# Patient Record
Sex: FEMALE | Race: WHITE | NOT HISPANIC OR LATINO | Employment: OTHER | URBAN - METROPOLITAN AREA
[De-identification: names, ages, dates, MRNs, and addresses within clinical notes are randomized per-mention and may not be internally consistent; named-entity substitution may affect disease eponyms.]

---

## 2017-02-14 ENCOUNTER — APPOINTMENT (OUTPATIENT)
Dept: WOMENS IMAGING | Facility: HOSPITAL | Age: 52
End: 2017-02-14

## 2017-02-14 PROCEDURE — 77067 SCR MAMMO BI INCL CAD: CPT | Performed by: RADIOLOGY

## 2017-02-22 RX ORDER — ROSUVASTATIN CALCIUM 40 MG/1
40 TABLET, COATED ORAL DAILY
Qty: 90 TABLET | Refills: 0 | Status: SHIPPED | OUTPATIENT
Start: 2017-02-22 | End: 2017-05-19 | Stop reason: SDUPTHER

## 2017-05-19 ENCOUNTER — OFFICE VISIT (OUTPATIENT)
Dept: ENDOCRINOLOGY | Age: 52
End: 2017-05-19

## 2017-05-19 VITALS
DIASTOLIC BLOOD PRESSURE: 70 MMHG | HEIGHT: 68 IN | BODY MASS INDEX: 29.7 KG/M2 | WEIGHT: 196 LBS | SYSTOLIC BLOOD PRESSURE: 130 MMHG

## 2017-05-19 DIAGNOSIS — E78.5 HYPERLIPIDEMIA, UNSPECIFIED HYPERLIPIDEMIA TYPE: ICD-10-CM

## 2017-05-19 DIAGNOSIS — R53.82 CHRONIC FATIGUE: ICD-10-CM

## 2017-05-19 DIAGNOSIS — E55.9 AVITAMINOSIS D: ICD-10-CM

## 2017-05-19 DIAGNOSIS — E04.9 GOITER, NON-TOXIC: ICD-10-CM

## 2017-05-19 DIAGNOSIS — R73.03 PREDIABETES: ICD-10-CM

## 2017-05-19 DIAGNOSIS — E03.9 ADULT HYPOTHYROIDISM: Primary | ICD-10-CM

## 2017-05-19 PROCEDURE — 99214 OFFICE O/P EST MOD 30 MIN: CPT | Performed by: NURSE PRACTITIONER

## 2017-05-19 RX ORDER — ROSUVASTATIN CALCIUM 40 MG/1
40 TABLET, COATED ORAL DAILY
Qty: 90 TABLET | Refills: 3 | Status: SHIPPED | OUTPATIENT
Start: 2017-05-19 | End: 2017-05-31 | Stop reason: SDUPTHER

## 2017-05-19 RX ORDER — ERGOCALCIFEROL 1.25 MG/1
CAPSULE ORAL
Qty: 24 CAPSULE | Refills: 4 | Status: SHIPPED | OUTPATIENT
Start: 2017-05-19 | End: 2017-05-31 | Stop reason: SDUPTHER

## 2017-05-19 RX ORDER — LEVOTHYROXINE SODIUM 75 MCG
75 TABLET ORAL DAILY
Qty: 90 TABLET | Refills: 3 | Status: SHIPPED | OUTPATIENT
Start: 2017-05-19 | End: 2018-06-07

## 2017-05-25 LAB
25(OH)D3+25(OH)D2 SERPL-MCNC: 25.3 NG/ML (ref 30–100)
ACTH PLAS-MCNC: 23.1 PG/ML (ref 7.2–63.3)
ALBUMIN SERPL-MCNC: 4.7 G/DL (ref 3.5–5.2)
ALBUMIN/GLOB SERPL: 1.7 G/DL
ALP SERPL-CCNC: 62 U/L (ref 39–117)
ALT SERPL-CCNC: 77 U/L (ref 1–33)
AST SERPL-CCNC: 47 U/L (ref 1–32)
BILIRUB SERPL-MCNC: 0.4 MG/DL (ref 0.1–1.2)
BUN SERPL-MCNC: 13 MG/DL (ref 6–20)
BUN/CREAT SERPL: 18.6 (ref 7–25)
C PEPTIDE SERPL-MCNC: 3.6 NG/ML (ref 1.1–4.4)
CALCIUM SERPL-MCNC: 9.8 MG/DL (ref 8.6–10.5)
CHLORIDE SERPL-SCNC: 104 MMOL/L (ref 98–107)
CHOLEST SERPL-MCNC: 268 MG/DL (ref 0–200)
CO2 SERPL-SCNC: 25.5 MMOL/L (ref 22–29)
CORTIS SERPL-MCNC: 5.9 UG/DL
CREAT SERPL-MCNC: 0.7 MG/DL (ref 0.57–1)
FT4I SERPL CALC-MCNC: 2.2 (ref 1.2–4.9)
GLOBULIN SER CALC-MCNC: 2.8 GM/DL
GLUCOSE SERPL-MCNC: 103 MG/DL (ref 65–99)
HBA1C MFR BLD: 5.45 % (ref 4.8–5.6)
HDLC SERPL-MCNC: 67 MG/DL (ref 40–60)
LDLC SERPL CALC-MCNC: 185 MG/DL (ref 0–100)
POTASSIUM SERPL-SCNC: 4.4 MMOL/L (ref 3.5–5.2)
PROT SERPL-MCNC: 7.5 G/DL (ref 6–8.5)
SODIUM SERPL-SCNC: 143 MMOL/L (ref 136–145)
T3FREE SERPL-MCNC: 2.8 PG/ML (ref 2–4.4)
T3RU NFR SERPL: 31 % (ref 24–39)
T4 FREE SERPL-MCNC: 1.21 NG/DL (ref 0.93–1.7)
T4 SERPL-MCNC: 7 UG/DL (ref 4.5–12)
THYROGLOB AB SERPL-ACNC: 41 IU/ML
THYROGLOB SERPL-MCNC: 17 NG/ML
THYROGLOB SERPL-MCNC: ABNORMAL NG/ML
TRIGL SERPL-MCNC: 81 MG/DL (ref 0–150)
TSH SERPL DL<=0.005 MIU/L-ACNC: 2.07 UIU/ML (ref 0.45–4.5)
VLDLC SERPL CALC-MCNC: 16.2 MG/DL (ref 5–40)

## 2017-05-31 RX ORDER — ROSUVASTATIN CALCIUM 40 MG/1
40 TABLET, COATED ORAL DAILY
Qty: 90 TABLET | Refills: 1 | Status: SHIPPED | OUTPATIENT
Start: 2017-05-31 | End: 2018-05-31

## 2017-05-31 RX ORDER — ERGOCALCIFEROL 1.25 MG/1
CAPSULE ORAL
Qty: 36 CAPSULE | Refills: 1 | Status: SHIPPED | OUTPATIENT
Start: 2017-05-31 | End: 2018-07-23 | Stop reason: DRUGHIGH

## 2017-06-05 ENCOUNTER — HOSPITAL ENCOUNTER (OUTPATIENT)
Dept: ULTRASOUND IMAGING | Facility: HOSPITAL | Age: 52
Discharge: HOME | End: 2017-06-05
Admitting: NURSE PRACTITIONER

## 2017-06-05 DIAGNOSIS — E04.9 GOITER, NON-TOXIC: ICD-10-CM

## 2017-06-05 DIAGNOSIS — R53.82 CHRONIC FATIGUE: ICD-10-CM

## 2017-06-05 DIAGNOSIS — E03.9 ADULT HYPOTHYROIDISM: ICD-10-CM

## 2017-06-05 DIAGNOSIS — R73.03 PREDIABETES: ICD-10-CM

## 2017-06-05 DIAGNOSIS — E78.5 HYPERLIPIDEMIA, UNSPECIFIED HYPERLIPIDEMIA TYPE: ICD-10-CM

## 2017-06-05 DIAGNOSIS — E55.9 AVITAMINOSIS D: ICD-10-CM

## 2017-06-05 PROCEDURE — 76536 US EXAM OF HEAD AND NECK: CPT

## 2018-05-08 DIAGNOSIS — E03.9 ADULT HYPOTHYROIDISM: Primary | ICD-10-CM

## 2018-05-08 DIAGNOSIS — E55.9 AVITAMINOSIS D: ICD-10-CM

## 2018-05-08 DIAGNOSIS — E78.5 HYPERLIPIDEMIA, UNSPECIFIED HYPERLIPIDEMIA TYPE: ICD-10-CM

## 2018-05-11 ENCOUNTER — LAB (OUTPATIENT)
Dept: ENDOCRINOLOGY | Age: 53
End: 2018-05-11

## 2018-05-11 DIAGNOSIS — E03.9 ADULT HYPOTHYROIDISM: ICD-10-CM

## 2018-05-11 DIAGNOSIS — E55.9 AVITAMINOSIS D: ICD-10-CM

## 2018-05-11 DIAGNOSIS — E78.5 HYPERLIPIDEMIA, UNSPECIFIED HYPERLIPIDEMIA TYPE: ICD-10-CM

## 2018-05-16 LAB
25(OH)D3+25(OH)D2 SERPL-MCNC: 42 NG/ML (ref 30–100)
ALBUMIN SERPL-MCNC: 4.9 G/DL (ref 3.5–5.2)
ALBUMIN/GLOB SERPL: 2 G/DL
ALP SERPL-CCNC: 63 U/L (ref 39–117)
ALT SERPL-CCNC: 91 U/L (ref 1–33)
AST SERPL-CCNC: 56 U/L (ref 1–32)
BILIRUB SERPL-MCNC: 0.4 MG/DL (ref 0.1–1.2)
BUN SERPL-MCNC: 14 MG/DL (ref 6–20)
BUN/CREAT SERPL: 18.7 (ref 7–25)
CALCIUM SERPL-MCNC: 9.9 MG/DL (ref 8.6–10.5)
CHLORIDE SERPL-SCNC: 102 MMOL/L (ref 98–107)
CHOLEST SERPL-MCNC: 250 MG/DL (ref 0–200)
CO2 SERPL-SCNC: 26.2 MMOL/L (ref 22–29)
CREAT SERPL-MCNC: 0.75 MG/DL (ref 0.57–1)
FT4I SERPL CALC-MCNC: 2 (ref 1.2–4.9)
GFR SERPLBLD CREATININE-BSD FMLA CKD-EPI: 81 ML/MIN/1.73
GFR SERPLBLD CREATININE-BSD FMLA CKD-EPI: 98 ML/MIN/1.73
GLOBULIN SER CALC-MCNC: 2.4 GM/DL
GLUCOSE SERPL-MCNC: 99 MG/DL (ref 65–99)
HDLC SERPL-MCNC: 66 MG/DL (ref 40–60)
INTERPRETATION: NORMAL
LDLC SERPL CALC-MCNC: 166 MG/DL (ref 0–100)
POTASSIUM SERPL-SCNC: 4.9 MMOL/L (ref 3.5–5.2)
PROT SERPL-MCNC: 7.3 G/DL (ref 6–8.5)
SODIUM SERPL-SCNC: 143 MMOL/L (ref 136–145)
T3FREE SERPL-MCNC: 3.2 PG/ML (ref 2–4.4)
T3RU NFR SERPL: 28 % (ref 24–39)
T4 FREE SERPL-MCNC: 1.25 NG/DL (ref 0.93–1.7)
T4 SERPL-MCNC: 7.3 UG/DL (ref 4.5–12)
THYROGLOB AB SERPL-ACNC: 27 IU/ML
THYROGLOB SERPL-MCNC: 14 NG/ML
THYROGLOB SERPL-MCNC: ABNORMAL NG/ML
TRIGL SERPL-MCNC: 92 MG/DL (ref 0–150)
TSH SERPL DL<=0.005 MIU/L-ACNC: 2.83 UIU/ML (ref 0.45–4.5)
VLDLC SERPL CALC-MCNC: 18.4 MG/DL (ref 5–40)

## 2018-06-07 ENCOUNTER — OFFICE VISIT (OUTPATIENT)
Dept: ENDOCRINOLOGY | Age: 53
End: 2018-06-07

## 2018-06-07 VITALS
HEIGHT: 68 IN | WEIGHT: 206 LBS | BODY MASS INDEX: 31.22 KG/M2 | SYSTOLIC BLOOD PRESSURE: 122 MMHG | DIASTOLIC BLOOD PRESSURE: 76 MMHG

## 2018-06-07 DIAGNOSIS — E04.9 GOITER, NON-TOXIC: ICD-10-CM

## 2018-06-07 DIAGNOSIS — E04.1 SOLITARY THYROID NODULE: ICD-10-CM

## 2018-06-07 DIAGNOSIS — E55.9 AVITAMINOSIS D: ICD-10-CM

## 2018-06-07 DIAGNOSIS — E03.9 ADULT HYPOTHYROIDISM: Primary | ICD-10-CM

## 2018-06-07 DIAGNOSIS — E78.5 HYPERLIPIDEMIA, UNSPECIFIED HYPERLIPIDEMIA TYPE: ICD-10-CM

## 2018-06-07 DIAGNOSIS — R73.03 PREDIABETES: ICD-10-CM

## 2018-06-07 DIAGNOSIS — R79.89 LFT ELEVATION: ICD-10-CM

## 2018-06-07 DIAGNOSIS — R53.82 CHRONIC FATIGUE: ICD-10-CM

## 2018-06-07 PROBLEM — R00.2 HEART PALPITATIONS: Status: ACTIVE | Noted: 2018-06-07

## 2018-06-07 PROCEDURE — 99214 OFFICE O/P EST MOD 30 MIN: CPT | Performed by: NURSE PRACTITIONER

## 2018-06-07 RX ORDER — ROSUVASTATIN CALCIUM 40 MG/1
40 TABLET, COATED ORAL DAILY
Qty: 90 TABLET | Refills: 1 | Status: SHIPPED | OUTPATIENT
Start: 2018-06-07 | End: 2019-06-07

## 2018-06-07 RX ORDER — LEVOTHYROXINE SODIUM 88 MCG
88 TABLET ORAL DAILY
Qty: 90 TABLET | Refills: 3 | Status: SHIPPED | OUTPATIENT
Start: 2018-06-07 | End: 2019-02-05

## 2018-06-07 NOTE — PATIENT INSTRUCTIONS
crestor 40 mg once daily  Diet and exercise  Cardiology referral  Sleep medicine referral  Labs pending   Increase synthroid to 88 mcg daily on empty stomach  Thyroid u/s

## 2018-06-07 NOTE — PROGRESS NOTES
"Subjective   Theresa Palacio is a 52 y.o. female is here today for follow-up.  Chief Complaint   Patient presents with   • Hypothyroidism     recent labs   • Hyperlipidemia   • Goiter     /76   Ht 171.5 cm (67.5\")   Wt 93.4 kg (206 lb)   BMI 31.79 kg/m²   Current Outpatient Prescriptions on File Prior to Visit   Medication Sig   • aspirin 81 MG tablet Take 81 mg by mouth daily.   • vitamin D (ERGOCALCIFEROL) 49345 UNITS capsule capsule Take 1 capsule 3 times weekly   • [DISCONTINUED] SYNTHROID 75 MCG tablet Take 1 tablet by mouth Daily.     No current facility-administered medications on file prior to visit.      Family History   Problem Relation Age of Onset   • Thyroid disease Mother    • Diabetes Father      Social History   Substance Use Topics   • Smoking status: Former Smoker   • Smokeless tobacco: Not on file      Comment: 1 year back in high school    • Alcohol use Yes      Comment: socially      Allergies   Allergen Reactions   • Sulfa Antibiotics          History of Present Illness   Encounter Diagnoses   Name Primary?   • Hyperlipidemia, unspecified hyperlipidemia type    • Avitaminosis D    • Adult hypothyroidism Yes   • Goiter, non-toxic    • Chronic fatigue    • Prediabetes    • Solitary thyroid nodule    • LFT elevation      52-year-old female patient here today for a routine follow-up visit.  She is being seen for the above-mentioned problems.  She has a history of a thyroid nodule which was last checked a year ago.  She will have a repeat thyroid ultrasound ordered.  She had recent labs which were reviewed and she was provided a copy.  We missed getting her hemoglobin A1c so she will have additional labs at today's visit.  She has numerous complaints at today's visit.  She is complaining of palpitations that occur weekly, she is having problems with weight loss, chronic fatigue, and numbness around her face, arm, and leg sometimes.  She is starting to eat a gluten-free diet because she " feels that most of these issues are related to Hashimoto's.  She states she has read a book recently on Hashimoto's and she feels like this is the source of most of her problems.  She does snore at night has never had sleep medicine evaluation.  She has not been to a cardiologist.  She was to increase her thyroid dose if at all possible.  She is taking a probiotic for constipation.  She does feel like she is more anxious at times and is not sure if this is related to her thyroid.  She has not been taking her Crestor as prescribed.  She does have a history of fatty liver and has had a liver ultrasound in the past.    The following portions of the patient's history were reviewed and updated as appropriate: allergies, current medications, past family history, past medical history, past social history, past surgical history and problem list.    Review of Systems   Constitutional: Negative for fatigue.   HENT: Negative for trouble swallowing.    Eyes: Negative for visual disturbance.   Respiratory: Negative for shortness of breath.    Cardiovascular: Negative for leg swelling.   Endocrine: Negative for polyuria.   Skin: Negative for wound.   Neurological: Negative for numbness.       Objective   Physical Exam   Constitutional: She is oriented to person, place, and time. She appears well-developed and well-nourished. No distress.   HENT:   Head: Normocephalic and atraumatic.   Right Ear: External ear normal.   Left Ear: External ear normal.   Nose: Nose normal.   Mouth/Throat: Oropharynx is clear and moist. No oropharyngeal exudate.   Eyes: EOM are normal. Pupils are equal, round, and reactive to light. Right eye exhibits no discharge. Left eye exhibits no discharge.   Neck: Trachea normal, normal range of motion and full passive range of motion without pain. Neck supple. No tracheal tenderness present. Carotid bruit is not present. No tracheal deviation, no edema and no erythema present. No thyroid mass and no  thyromegaly present.   Cardiovascular: Normal rate, regular rhythm, normal heart sounds and intact distal pulses.  Exam reveals no gallop and no friction rub.    No murmur heard.  Pulmonary/Chest: Effort normal and breath sounds normal. No stridor. No respiratory distress. She has no wheezes. She has no rales.   Abdominal: Soft. Bowel sounds are normal. She exhibits no distension.   Musculoskeletal: Normal range of motion. She exhibits no edema or deformity.   Lymphadenopathy:     She has no cervical adenopathy.   Neurological: She is alert and oriented to person, place, and time.   Skin: Skin is warm and dry. No rash noted. She is not diaphoretic. No erythema. No pallor.   Psychiatric: She has a normal mood and affect. Her behavior is normal. Judgment and thought content normal.   Nursing note and vitals reviewed.        Assessment/Plan   Problems Addressed this Visit        Cardiovascular and Mediastinum    HLD (hyperlipidemia)    Relevant Medications    SYNTHROID 88 MCG tablet    rosuvastatin (CRESTOR) 40 MG tablet    Other Relevant Orders    Ambulatory Referral to Cardiology    Ambulatory Referral to Sleep Medicine    C-Peptide    Hemoglobin A1c    ACTH    Cortisol    US Thyroid       Digestive    Avitaminosis D    Relevant Medications    SYNTHROID 88 MCG tablet    Other Relevant Orders    Ambulatory Referral to Cardiology    Ambulatory Referral to Sleep Medicine    C-Peptide    Hemoglobin A1c    ACTH    Cortisol    US Thyroid       Endocrine    Adult hypothyroidism - Primary    Relevant Medications    SYNTHROID 88 MCG tablet    Other Relevant Orders    Ambulatory Referral to Cardiology    Ambulatory Referral to Sleep Medicine    C-Peptide    Hemoglobin A1c    ACTH    Cortisol    US Thyroid    Goiter, non-toxic    Relevant Medications    SYNTHROID 88 MCG tablet    Other Relevant Orders    Ambulatory Referral to Cardiology    Ambulatory Referral to Sleep Medicine    C-Peptide    Hemoglobin A1c    ACTH    Cortisol     US Thyroid    Solitary thyroid nodule    Relevant Medications    SYNTHROID 88 MCG tablet    Other Relevant Orders    US Thyroid       Other    Fatigue    Relevant Medications    SYNTHROID 88 MCG tablet    Other Relevant Orders    Ambulatory Referral to Cardiology    Ambulatory Referral to Sleep Medicine    C-Peptide    Hemoglobin A1c    ACTH    Cortisol    US Thyroid    LFT elevation    Relevant Orders    US Thyroid    Prediabetes    Relevant Medications    SYNTHROID 88 MCG tablet    Other Relevant Orders    Ambulatory Referral to Cardiology    Ambulatory Referral to Sleep Medicine    C-Peptide    Hemoglobin A1c    ACTH    Cortisol    US Thyroid      In summary, patient was seen and examined.  She will be referred to cardiologist for complaints of heart palpitations.  She will be referred to sleep medicine for further evaluation of chronic fatigue and snoring.  She will have a thyroid ultrasound scheduled.  She has been encouraged to diet exercise for weight loss.  She also has been restarted on Crestor 40 mg once daily for dyslipidemia and is currently uncontrolled.  She will have additional labs today to evaluate adrenal and A1c.  Her Synthroid has been increased to 88 µg daily.  She will follow-up in 6 months with Dr. Sandy.  crestor 40 mg once daily  Diet and exercise  Cardiology referral  Sleep medicine referral  Labs pending   Increase synthroid to 88 mcg daily on empty stomach  Thyroid u/s

## 2018-06-08 LAB
ACTH PLAS-MCNC: 27.7 PG/ML (ref 7.2–63.3)
C PEPTIDE SERPL-MCNC: 4.8 NG/ML (ref 1.1–4.4)
CORTIS SERPL-MCNC: 8.3 UG/DL
HBA1C MFR BLD: 5.84 % (ref 4.8–5.6)

## 2018-06-12 RX ORDER — ERGOCALCIFEROL 1.25 MG/1
CAPSULE ORAL
Qty: 8 CAPSULE | Refills: 3 | Status: SHIPPED | OUTPATIENT
Start: 2018-06-12 | End: 2019-02-05 | Stop reason: SDUPTHER

## 2018-06-25 ENCOUNTER — APPOINTMENT (OUTPATIENT)
Dept: WOMENS IMAGING | Facility: HOSPITAL | Age: 53
End: 2018-06-25

## 2018-06-25 PROCEDURE — 77067 SCR MAMMO BI INCL CAD: CPT | Performed by: RADIOLOGY

## 2018-06-25 PROCEDURE — 77063 BREAST TOMOSYNTHESIS BI: CPT | Performed by: RADIOLOGY

## 2018-06-28 ENCOUNTER — HOSPITAL ENCOUNTER (OUTPATIENT)
Dept: ULTRASOUND IMAGING | Facility: HOSPITAL | Age: 53
Discharge: HOME | End: 2018-06-28
Admitting: NURSE PRACTITIONER

## 2018-06-28 DIAGNOSIS — E55.9 AVITAMINOSIS D: ICD-10-CM

## 2018-06-28 DIAGNOSIS — E03.9 ADULT HYPOTHYROIDISM: ICD-10-CM

## 2018-06-28 DIAGNOSIS — R79.89 LFT ELEVATION: ICD-10-CM

## 2018-06-28 DIAGNOSIS — E78.5 HYPERLIPIDEMIA, UNSPECIFIED HYPERLIPIDEMIA TYPE: ICD-10-CM

## 2018-06-28 DIAGNOSIS — E04.1 SOLITARY THYROID NODULE: ICD-10-CM

## 2018-06-28 DIAGNOSIS — R73.03 PREDIABETES: ICD-10-CM

## 2018-06-28 DIAGNOSIS — E04.9 GOITER, NON-TOXIC: ICD-10-CM

## 2018-06-28 DIAGNOSIS — R53.82 CHRONIC FATIGUE: ICD-10-CM

## 2018-06-28 PROCEDURE — 76536 US EXAM OF HEAD AND NECK: CPT

## 2018-07-03 ENCOUNTER — APPOINTMENT (OUTPATIENT)
Dept: WOMENS IMAGING | Facility: HOSPITAL | Age: 53
End: 2018-07-03

## 2018-07-03 PROCEDURE — 77061 BREAST TOMOSYNTHESIS UNI: CPT | Performed by: RADIOLOGY

## 2018-07-03 PROCEDURE — G0279 TOMOSYNTHESIS, MAMMO: HCPCS | Performed by: RADIOLOGY

## 2018-07-03 PROCEDURE — 76641 ULTRASOUND BREAST COMPLETE: CPT | Performed by: RADIOLOGY

## 2018-07-03 PROCEDURE — 77065 DX MAMMO INCL CAD UNI: CPT | Performed by: RADIOLOGY

## 2018-07-24 ENCOUNTER — OFFICE VISIT (OUTPATIENT)
Dept: CARDIOLOGY | Facility: CLINIC | Age: 53
End: 2018-07-24

## 2018-07-24 VITALS
HEART RATE: 64 BPM | DIASTOLIC BLOOD PRESSURE: 74 MMHG | BODY MASS INDEX: 31.22 KG/M2 | SYSTOLIC BLOOD PRESSURE: 124 MMHG | WEIGHT: 206 LBS | HEIGHT: 68 IN

## 2018-07-24 DIAGNOSIS — R00.2 PALPITATIONS: Primary | ICD-10-CM

## 2018-07-24 DIAGNOSIS — R94.31 ABNORMAL EKG: ICD-10-CM

## 2018-07-24 DIAGNOSIS — R06.09 DOE (DYSPNEA ON EXERTION): ICD-10-CM

## 2018-07-24 PROCEDURE — 93000 ELECTROCARDIOGRAM COMPLETE: CPT | Performed by: INTERNAL MEDICINE

## 2018-07-24 PROCEDURE — 99204 OFFICE O/P NEW MOD 45 MIN: CPT | Performed by: INTERNAL MEDICINE

## 2018-07-24 NOTE — PROGRESS NOTES
Date of Office Visit: 2018  Encounter Provider: Marlin Ryder MD  Place of Service: [facility] CARDIOLOGY  Patient Name: Theresa Palacio  :1965    Chief complaint  Consult requested by CHELLE Kay for evaluation of palpitations.    History of Present Illness  The patient is a 53-year-old female with history of hyperlipidemia, hypercoagulable state and chronic constipation.  She states that she is active, but does not exercise.  She has a history of three miscarriages and in  had questionable neurologic symptoms.  At that time, she was assessed for hypercoagulable state and some of her blood work was abnormal, though specifics are not available.  She was also seen by Dr. Curtis and by description had a transesophageal echocardiogram performed that was negative.  I am not able to locate any of these records.  She states that she has a history of Hashimoto's disease diagnosed 10 years ago, but labs have been stable.  She states that for the last year, she has had palpitations that may occur several times a week, described as fluttering that lasts a few seconds, often associated with mild dyspnea.  She has also noted dyspnea on exertion over the past 1 year, especially when walking up stairs.  She has gained 8 pounds.  She does snore at night and has daytime somnolence, but has not noted apneic episodes.    Past Medical History:   Diagnosis Date   • Fatigue    • Hyperlipidemia    • Hypothyroidism    • LFT elevation    • Prediabetes    • Vitamin D deficiency      Past Surgical History:   Procedure Laterality Date   • APPENDECTOMY     •  SECTION     • HYSTERECTOMY     • LYMPHADENECTOMY     • TONSILLECTOMY       Outpatient Medications Prior to Visit   Medication Sig Dispense Refill   • aspirin 81 MG tablet Take 81 mg by mouth daily.     • rosuvastatin (CRESTOR) 40 MG tablet Take 1 tablet by mouth Daily. 90 tablet 1   • SYNTHROID 88 MCG tablet Take 1 tablet  by mouth Daily. 90 tablet 3   • vitamin D (ERGOCALCIFEROL) 19368 units capsule capsule TAKE ONE CAPSULE BY MOUTH TWICE  WEEKLY 8 capsule 3   • vitamin D (ERGOCALCIFEROL) 12177 UNITS capsule capsule Take 1 capsule 3 times weekly 36 capsule 1     No facility-administered medications prior to visit.        Allergies as of 07/24/2018 - Reviewed 07/24/2018   Allergen Reaction Noted   • Sulfa antibiotics  05/20/2016     Social History     Social History   • Marital status: Unknown     Spouse name: N/A   • Number of children: N/A   • Years of education: N/A     Occupational History   • Not on file.     Social History Main Topics   • Smoking status: Former Smoker     Years: 1.00   • Smokeless tobacco: Never Used      Comment: 1 year back in high school    • Alcohol use 2.4 oz/week     4 Glasses of wine per week      Comment: Caffeine - 2 cups daily   • Drug use: Unknown   • Sexual activity: Not on file     Other Topics Concern   • Not on file     Social History Narrative   • No narrative on file     Family History   Problem Relation Age of Onset   • Thyroid disease Mother    • Diabetes Father    • Stroke Father    • Stroke Maternal Grandmother    • Breast cancer Maternal Grandmother    • Stroke Maternal Grandfather      Review of Systems   Constitution: Positive for malaise/fatigue. Negative for fever, weight gain and weight loss.   HENT: Negative for ear pain, hearing loss, nosebleeds and sore throat.    Eyes: Negative for double vision, pain, vision loss in left eye and vision loss in right eye.   Cardiovascular: Positive for leg swelling.        See history of present illness.   Respiratory: Positive for shortness of breath. Negative for cough, sleep disturbances due to breathing, snoring and wheezing.    Endocrine: Negative for cold intolerance, heat intolerance and polyuria.   Skin: Negative for itching, poor wound healing and rash.   Musculoskeletal: Positive for myalgias. Negative for joint pain and joint swelling.  "  Gastrointestinal: Positive for abdominal pain. Negative for diarrhea, hematochezia, nausea and vomiting.        Intermittently diverticular pain   Genitourinary: Negative for hematuria and hesitancy.   Neurological: Positive for paresthesias. Negative for numbness and seizures.   Psychiatric/Behavioral: Negative for depression. The patient is nervous/anxious.         Objective:     Vitals:    07/24/18 1057 07/24/18 1103   BP: 120/70 124/74   BP Location: Right arm Left arm   Pulse: 64    Weight: 93.4 kg (206 lb)    Height: 171.5 cm (67.5\")      Body mass index is 31.79 kg/m².    Physical Exam   Constitutional: She is oriented to person, place, and time. She appears well-developed and well-nourished.   Obese   HENT:   Head: Normocephalic.   Nose: Nose normal.   Mouth/Throat: Oropharynx is clear and moist.   Eyes: Pupils are equal, round, and reactive to light. Conjunctivae and EOM are normal. Right eye exhibits no discharge. No scleral icterus.   Neck: Normal range of motion. Neck supple. No JVD present. No thyromegaly present.   Cardiovascular: Normal rate, regular rhythm, normal heart sounds and intact distal pulses.  Exam reveals no gallop and no friction rub.    No murmur heard.  Pulses:       Carotid pulses are 2+ on the right side, and 2+ on the left side.       Radial pulses are 2+ on the right side, and 2+ on the left side.        Femoral pulses are 2+ on the right side, and 2+ on the left side.       Popliteal pulses are 2+ on the right side, and 2+ on the left side.        Dorsalis pedis pulses are 2+ on the right side, and 2+ on the left side.        Posterior tibial pulses are 2+ on the right side, and 2+ on the left side.   Pulmonary/Chest: Effort normal and breath sounds normal. No respiratory distress. She has no wheezes. She has no rales.   Abdominal: Soft. Bowel sounds are normal. She exhibits no distension. There is no hepatosplenomegaly. There is no tenderness. There is no rebound. "   Musculoskeletal: Normal range of motion. She exhibits no edema or tenderness.   Neurological: She is alert and oriented to person, place, and time.   Skin: Skin is warm and dry. No rash noted. No erythema.   Psychiatric: She has a normal mood and affect. Her behavior is normal. Judgment and thought content normal.   Vitals reviewed.    Lab Review:     ECG 12 Lead  Date/Time: 7/24/2018 11:06 AM  Performed by: SUHA HOPPER  Authorized by: SUHA HOPPER   Comparison: compared with previous ECG   Similar to previous ECG  Rhythm: sinus rhythm  Conduction comments: Nonspecific ST T wave changes  Clinical impression: abnormal ECG          Assessment:       Diagnosis Plan   1. Palpitations  ECG 12 Lead    Holter Monitor - 72 Hour Up To 21 Days    Adult Transthoracic Echo Complete W/ Cont if Necessary Per Protocol   2. MARINELLI (dyspnea on exertion)  ECG 12 Lead    Adult Transthoracic Echo Complete W/ Cont if Necessary Per Protocol    Stress Test With Myocardial Perfusion One Day   3. Abnormal EKG  Holter Monitor - 72 Hour Up To 21 Days    Adult Transthoracic Echo Complete W/ Cont if Necessary Per Protocol    Stress Test With Myocardial Perfusion One Day     Plan:       1. Palpitations. Will place a 2-week Zio patch and also check an echocardiogram.   2. Dyspnea on exertion. Will check an exercise Cardiolite stress test. Her EKG is not normal with nonspecific anterior ST-T wave changes. Some of this was also present in 07/2017. She has nonspecific ST-T wave changes in the anterolateral leads and multiple risk factors for coronary artery disease.   3. Nonspecified hypercoagulable state. She was seen by Hematology in the past. She does not recall being told to take additional precautions.   4. Probable sleep apnea with snoring and daytime somnolence. Will check a home sleep study.   5. Dyspnea on exertion, as above.   6. Dyslipidemia. She just started taking Crestor on a regular basis and is to have follow-up blood work with Georgina  CHELLE Miller.   7. History of Hashimoto thyroiditis being managed by CHELLE Kay.        Your medication list          Accurate as of 7/24/18 11:59 PM. If you have any questions, ask your nurse or doctor.               CHANGE how you take these medications      Instructions Last Dose Given Next Dose Due   vitamin D 75147 units capsule capsule  Commonly known as:  ERGOCALCIFEROL  What changed:  Another medication with the same name was removed. Continue taking this medication, and follow the directions you see here.  Changed by:  Marlin Ryder MD      TAKE ONE CAPSULE BY MOUTH TWICE  WEEKLY          CONTINUE taking these medications      Instructions Last Dose Given Next Dose Due   aspirin 81 MG tablet      Take 81 mg by mouth daily.       rosuvastatin 40 MG tablet  Commonly known as:  CRESTOR      Take 1 tablet by mouth Daily.       SYNTHROID 88 MCG tablet  Generic drug:  levothyroxine      Take 1 tablet by mouth Daily.                  Dictated utilizing Dragon dictation

## 2018-07-25 ENCOUNTER — TELEPHONE (OUTPATIENT)
Dept: CARDIOLOGY | Facility: CLINIC | Age: 53
End: 2018-07-25

## 2018-07-25 DIAGNOSIS — R06.83 SNORING: Primary | ICD-10-CM

## 2018-07-25 DIAGNOSIS — R40.0 DAYTIME SOMNOLENCE: ICD-10-CM

## 2018-07-29 PROBLEM — R94.31 ABNORMAL EKG: Status: ACTIVE | Noted: 2018-07-29

## 2018-07-29 PROBLEM — R06.09 DOE (DYSPNEA ON EXERTION): Status: ACTIVE | Noted: 2018-07-29

## 2018-08-03 ENCOUNTER — APPOINTMENT (OUTPATIENT)
Dept: SLEEP MEDICINE | Facility: HOSPITAL | Age: 53
End: 2018-08-03
Attending: INTERNAL MEDICINE

## 2018-08-06 ENCOUNTER — APPOINTMENT (OUTPATIENT)
Dept: CARDIOLOGY | Facility: HOSPITAL | Age: 53
End: 2018-08-06
Attending: INTERNAL MEDICINE

## 2018-08-13 ENCOUNTER — TELEPHONE (OUTPATIENT)
Dept: CARDIOLOGY | Facility: CLINIC | Age: 53
End: 2018-08-13

## 2018-08-13 NOTE — TELEPHONE ENCOUNTER
Pt was calling to go over Zio Patch Results.  Please call her at [phone].    Dr Ryder-Do you want to change this pt to a home sleep monitor with Malaika?  Pt has NOT received a call from the  Sleep Lab.

## 2018-08-15 NOTE — TELEPHONE ENCOUNTER
Patient had some rare PACs and PVCs.  She had 8 runs of SVT (12 beats or less) on zio patch.  Her diary symptoms were not associated with arrhythmia or ectopy.  Patient's BP was 120/70 last office visit.  She has a history of hypothyroidism.    Patient needs to be sure to get sleep study done.       Dr. Ryder, would you like for me to discuss starting patient on a very low dose beta blocker or placing a referral to EP for further evaluation?

## 2018-08-16 DIAGNOSIS — R00.2 PALPITATIONS: Primary | ICD-10-CM

## 2018-08-16 RX ORDER — METOPROLOL SUCCINATE 25 MG/1
25 TABLET, EXTENDED RELEASE ORAL DAILY
Qty: 30 TABLET | Refills: 0 | Status: SHIPPED | OUTPATIENT
Start: 2018-08-16 | End: 2019-09-13 | Stop reason: SDUPTHER

## 2018-08-16 NOTE — TELEPHONE ENCOUNTER
Jenni- Do you want me to call her?  She was calling to go over results.  She can be reached at [phone].

## 2018-08-16 NOTE — TELEPHONE ENCOUNTER
Would start toprol xl 25 mg a dy and pursue sleep study and then can consider EP eval if sx persist. Also avoid stimulants. Please check with sleep lab and if not setup to do home sleep study in the next couple weeks, please have kimberly do this. flex

## 2018-08-16 NOTE — TELEPHONE ENCOUNTER
Info to pt.  No questions.  Pt will start Metoprolol and await call from [facility].  Rx sent.  Reminder set to check on pt in a few weeks.    Alessandro- Can you followup on the [facility] Sleep Study ref?

## 2018-10-02 ENCOUNTER — TELEPHONE (OUTPATIENT)
Dept: CARDIOLOGY | Facility: CLINIC | Age: 53
End: 2018-10-02

## 2018-10-04 DIAGNOSIS — G47.33 OSA (OBSTRUCTIVE SLEEP APNEA): Primary | ICD-10-CM

## 2018-10-10 NOTE — TELEPHONE ENCOUNTER
Spoke with pt.  Verbalized understanding.  Pt got appt for Sleep Consult 11/1/2018.  Pt has to move this appt.  Told her if she has ANY problems getting sooner vs later to let me know.  Ok with pt.  (done)

## 2018-10-12 NOTE — TELEPHONE ENCOUNTER
Her appointment with the pulmonologist is scheduled for 11-8-18, it was rescheduled from 11-1-18. She wanted to know if you could tell her how many times she stopped breathing during her sleep test. Pt # [phone]. brandi

## 2018-10-15 NOTE — TELEPHONE ENCOUNTER
Spoke with pt.  She will keep followup with Pulmonary and have him go over testing results.  (done)

## 2018-10-19 ENCOUNTER — HOSPITAL ENCOUNTER (OUTPATIENT)
Dept: CARDIOLOGY | Facility: HOSPITAL | Age: 53
Discharge: HOME | End: 2018-10-19
Attending: INTERNAL MEDICINE | Admitting: INTERNAL MEDICINE

## 2018-10-19 VITALS — BODY MASS INDEX: 30.31 KG/M2 | HEIGHT: 68 IN | WEIGHT: 200 LBS

## 2018-10-19 DIAGNOSIS — R06.09 DOE (DYSPNEA ON EXERTION): ICD-10-CM

## 2018-10-19 DIAGNOSIS — R94.31 ABNORMAL EKG: ICD-10-CM

## 2018-10-19 LAB
BH CV NUCLEAR PRIOR STUDY: 2
BH CV STRESS BP STAGE 1: NORMAL
BH CV STRESS BP STAGE 2: NORMAL
BH CV STRESS DURATION MIN STAGE 1: 3
BH CV STRESS DURATION MIN STAGE 2: 3
BH CV STRESS DURATION SEC STAGE 1: 0
BH CV STRESS DURATION SEC STAGE 2: 0
BH CV STRESS GRADE STAGE 1: 10
BH CV STRESS GRADE STAGE 2: 12
BH CV STRESS HR STAGE 1: 146
BH CV STRESS HR STAGE 2: 164
BH CV STRESS METS STAGE 1: 5
BH CV STRESS METS STAGE 2: 7.5
BH CV STRESS PROTOCOL 1: NORMAL
BH CV STRESS RECOVERY BP: NORMAL MMHG
BH CV STRESS RECOVERY HR: 97 BPM
BH CV STRESS SPEED STAGE 1: 1.7
BH CV STRESS SPEED STAGE 2: 2.5
BH CV STRESS STAGE 1: 1
BH CV STRESS STAGE 2: 2
LV EF NUC BP: 79 %
MAXIMAL PREDICTED HEART RATE: 165 BPM
PERCENT MAX PREDICTED HR: 99.39 %
STRESS BASELINE BP: NORMAL MMHG
STRESS BASELINE HR: 81 BPM
STRESS PERCENT HR: 117 %
STRESS POST ESTIMATED WORKLOAD: 7 METS
STRESS POST EXERCISE DUR MIN: 6 MIN
STRESS POST EXERCISE DUR SEC: 0 SEC
STRESS POST PEAK BP: NORMAL MMHG
STRESS POST PEAK HR: 164 BPM
STRESS TARGET HR: 140 BPM

## 2018-10-19 PROCEDURE — A9502 TC99M TETROFOSMIN: HCPCS | Performed by: INTERNAL MEDICINE

## 2018-10-19 PROCEDURE — 93016 CV STRESS TEST SUPVJ ONLY: CPT | Performed by: INTERNAL MEDICINE

## 2018-10-19 PROCEDURE — 78452 HT MUSCLE IMAGE SPECT MULT: CPT

## 2018-10-19 PROCEDURE — 0 TECHNETIUM TETROFOSMIN KIT: Performed by: INTERNAL MEDICINE

## 2018-10-19 PROCEDURE — 93018 CV STRESS TEST I&R ONLY: CPT | Performed by: INTERNAL MEDICINE

## 2018-10-19 PROCEDURE — 78452 HT MUSCLE IMAGE SPECT MULT: CPT | Performed by: INTERNAL MEDICINE

## 2018-10-19 PROCEDURE — 93017 CV STRESS TEST TRACING ONLY: CPT

## 2018-10-19 RX ADMIN — TETROFOSMIN 1 DOSE: 1.38 INJECTION, POWDER, LYOPHILIZED, FOR SOLUTION INTRAVENOUS at 08:35

## 2018-10-19 RX ADMIN — TETROFOSMIN 1 DOSE: 1.38 INJECTION, POWDER, LYOPHILIZED, FOR SOLUTION INTRAVENOUS at 09:15

## 2018-10-21 ENCOUNTER — TELEPHONE (OUTPATIENT)
Dept: CARDIOLOGY | Facility: CLINIC | Age: 53
End: 2018-10-21

## 2018-10-22 NOTE — TELEPHONE ENCOUNTER
Pt was calling to go over Stress test.  Pt is aware she may not get a call until after clinic 10/23.  She said just to call her at [phone].

## 2018-10-25 NOTE — TELEPHONE ENCOUNTER
Called patient regarding test results.  Strongly recommended she Sleep apnea appointment and also ask about the need for oxygen therapy at night in addition to CPAP.flex

## 2018-10-25 NOTE — TELEPHONE ENCOUNTER
Pt was calling again to go over results.  She is completely fine with me giving her those results.  Please let me know what you would like me to tell her.

## 2018-11-08 ENCOUNTER — APPOINTMENT (OUTPATIENT)
Dept: SLEEP MEDICINE | Facility: HOSPITAL | Age: 53
End: 2018-11-08
Attending: INTERNAL MEDICINE

## 2018-11-08 ENCOUNTER — OFFICE VISIT (OUTPATIENT)
Dept: SLEEP MEDICINE | Facility: HOSPITAL | Age: 53
End: 2018-11-08
Attending: INTERNAL MEDICINE

## 2018-11-08 VITALS
SYSTOLIC BLOOD PRESSURE: 145 MMHG | HEART RATE: 88 BPM | HEIGHT: 68 IN | WEIGHT: 208.2 LBS | DIASTOLIC BLOOD PRESSURE: 89 MMHG | BODY MASS INDEX: 31.55 KG/M2

## 2018-11-08 DIAGNOSIS — R00.2 PALPITATIONS: ICD-10-CM

## 2018-11-08 DIAGNOSIS — G47.33 OSA (OBSTRUCTIVE SLEEP APNEA): Primary | ICD-10-CM

## 2018-11-08 PROCEDURE — G0463 HOSPITAL OUTPT CLINIC VISIT: HCPCS

## 2018-11-08 NOTE — PROGRESS NOTES
[facility]  Telephone: [phone] / Fax: [phone] [place]  Telephone: [phone] / Fax: [phone] [place]    Referring Physician: Ann Garza MD  PCP: Ann Garza MD    Reason for consult:  sleep apnea    Theresa Palacio is a 53 y.o.female  was seen in the Sleep Disorders Center today for evaluation of sleep apnea.  She reports EDS for several years and mild snoring.  Her bedtime schedule is 10pm- 6am. It takes her up to 10min to fall asleep. She has developed palpitations recently and was evaluated by Dr. Marlin Ryder. In view of sleepiness and snoring, HST was done in September 2018 and showed severe KAYLI with AHI 46- lowest desaturation to 79%. Patient is here today to discuss sleep apnea management and set up CPAP. She is a never smoker and denies prior history of lung disease. Her ESS is 6. In the past 5 years she gained 10 pounds. She wakes up with a panic feeling and startling.      SH- no tobacco, drinks 4-6 glasses of wine, 2 cups of coffee.    ROS- + myalgias, rest is negative.    Theersa Palacio  has a past medical history of Fatigue; Hyperlipidemia; Hypothyroidism; LFT elevation; Prediabetes; and Vitamin D deficiency. Goiter    Current Medications:    Current Outpatient Prescriptions:   •  aspirin 81 MG tablet, Take 81 mg by mouth daily., Disp: , Rfl:   •  metoprolol succinate XL (TOPROL XL) 25 MG 24 hr tablet, Take 1 tablet by mouth Daily., Disp: 30 tablet, Rfl: 0  •  rosuvastatin (CRESTOR) 40 MG tablet, Take 1 tablet by mouth Daily., Disp: 90 tablet, Rfl: 1  •  SYNTHROID 88 MCG tablet, Take 1 tablet by mouth Daily., Disp: 90 tablet, Rfl: 3  •  vitamin D (ERGOCALCIFEROL) 14152 units capsule capsule, TAKE ONE CAPSULE BY MOUTH TWICE  WEEKLY, Disp: 8 capsule, Rfl: 3    I have reviewed Past Medical History, Past Surgical History, Medication List, Social History and Family History as entered in Sleep Questionnaire and EPIC.    ESS  6   Vital  "Signs /89   Pulse 88   Ht 172.7 cm (68\")   Wt 94.4 kg (208 lb 3.2 oz)   BMI 31.66 kg/m²  Body mass index is 31.66 kg/m².    General Alert and oriented. No acute distress noted   Pharynx/Throat Class IV Mallampati airway, large tongue, no evidence of redundant lateral pharyngeal tissue. No oral lesions. No thrush. Moist mucous membranes.   Head Normocephalic. Symmetrical. Atraumatic.    Nose No septal deviation. No drainage   Chest Wall Normal shape. Symmetric expansion with respiration. No tenderness.   Neck Trachea midline, no thyromegaly or adenopathy    Lungs Clear to auscultation bilaterally. No wheezes. No rhonchi. No rales. Respirations regular, even and unlabored.   Heart Regular rhythm and normal rate. Normal S1 and S2. No murmur   Abdomen Soft, non-tender and non-distended. Normal bowel sounds. No masses.   Extremities Moves all extremities well. No edema   Psychiatric Normal mood and affect.          HST-  9/25/18- Malaika- AHI 46, lowest desat 79% .     Impression:  1. KAYLI (obstructive sleep apnea)    2. Palpitations          Plan:  I discussed the pathophysiology of obstructive sleep apnea with the patient.  We discussed the adverse outcomes associated with untreated sleep-disordered breathing.  Hopefully palpitations will subside if appropriately treated KAYLI.  We discussed treatment modalities of obstructive sleep apnea including CPAP. She agrees to start.  I will set her up with auto CPAP 5-20cm H2O. I will order overnight oximetry on CPAP RA few weeks after set up to make sure desaturation is corrected with appropriate management of KAYLI. Arrange O2 with CPAP if she continues to desaturate on CPAP. She may also require titration study.      Thank you for allowing me to participate in your patient's care.    The patient will follow up with Dr. Murdock in 3 months.      CHELLE Lux  [facility]  Phone: [phone]      Part of this note may be an electronic " transcription/translation of spoken language to printed text using the Dragon Dictation System. Some errors may exist even though the document was edited.

## 2018-11-09 ENCOUNTER — TELEPHONE (OUTPATIENT)
Dept: SLEEP MEDICINE | Facility: HOSPITAL | Age: 53
End: 2018-11-09

## 2018-11-09 NOTE — TELEPHONE ENCOUNTER
Pt seen in clinic 11/8/18 to discuss treatment options and results of HST ([place]).  Paperwork sent to [facility] for set up and BRAYDEN testing. alberta

## 2019-01-09 DIAGNOSIS — R73.03 PREDIABETES: ICD-10-CM

## 2019-01-09 DIAGNOSIS — E04.1 SOLITARY THYROID NODULE: ICD-10-CM

## 2019-01-09 DIAGNOSIS — E04.9 GOITER, NON-TOXIC: ICD-10-CM

## 2019-01-09 DIAGNOSIS — R53.82 CHRONIC FATIGUE: ICD-10-CM

## 2019-01-09 DIAGNOSIS — R79.89 LFT ELEVATION: ICD-10-CM

## 2019-01-09 DIAGNOSIS — E55.9 AVITAMINOSIS D: Primary | ICD-10-CM

## 2019-01-09 DIAGNOSIS — E03.9 ADULT HYPOTHYROIDISM: ICD-10-CM

## 2019-01-11 ENCOUNTER — DOCUMENTATION (OUTPATIENT)
Dept: SLEEP MEDICINE | Facility: HOSPITAL | Age: 54
End: 2019-01-11

## 2019-01-11 ENCOUNTER — TELEPHONE (OUTPATIENT)
Dept: SLEEP MEDICINE | Facility: HOSPITAL | Age: 54
End: 2019-01-11

## 2019-01-11 NOTE — TELEPHONE ENCOUNTER
Tim phoned pt to report that overnight oximetry results were evaluated by Dr. Murdock to be normal.  No supplemental oxygen needed. alberta

## 2019-01-14 ENCOUNTER — LAB (OUTPATIENT)
Dept: ENDOCRINOLOGY | Age: 54
End: 2019-01-14

## 2019-01-14 DIAGNOSIS — E03.9 ADULT HYPOTHYROIDISM: ICD-10-CM

## 2019-01-14 DIAGNOSIS — E04.9 GOITER, NON-TOXIC: ICD-10-CM

## 2019-01-14 DIAGNOSIS — E04.1 SOLITARY THYROID NODULE: ICD-10-CM

## 2019-01-14 DIAGNOSIS — R53.82 CHRONIC FATIGUE: ICD-10-CM

## 2019-01-14 DIAGNOSIS — R73.03 PREDIABETES: ICD-10-CM

## 2019-01-14 DIAGNOSIS — R79.89 LFT ELEVATION: ICD-10-CM

## 2019-01-14 DIAGNOSIS — E55.9 AVITAMINOSIS D: ICD-10-CM

## 2019-01-20 LAB
25(OH)D3+25(OH)D2 SERPL-MCNC: 26.2 NG/ML (ref 30–100)
ALBUMIN SERPL-MCNC: 4.5 G/DL (ref 3.5–5.2)
ALBUMIN/GLOB SERPL: 1.4 G/DL
ALP SERPL-CCNC: 61 U/L (ref 39–117)
ALT SERPL-CCNC: 141 U/L (ref 1–33)
AST SERPL-CCNC: 90 U/L (ref 1–32)
BILIRUB SERPL-MCNC: 0.5 MG/DL (ref 0.1–1.2)
BUN SERPL-MCNC: 13 MG/DL (ref 6–20)
BUN/CREAT SERPL: 17.8 (ref 7–25)
C PEPTIDE SERPL-MCNC: 3.4 NG/ML (ref 1.1–4.4)
CALCIUM SERPL-MCNC: 9.9 MG/DL (ref 8.6–10.5)
CHLORIDE SERPL-SCNC: 102 MMOL/L (ref 98–107)
CHOLEST SERPL-MCNC: 242 MG/DL (ref 0–200)
CO2 SERPL-SCNC: 26.7 MMOL/L (ref 22–29)
CREAT SERPL-MCNC: 0.73 MG/DL (ref 0.57–1)
GLOBULIN SER CALC-MCNC: 3.2 GM/DL
GLUCOSE SERPL-MCNC: 105 MG/DL (ref 65–99)
HBA1C MFR BLD: 5.94 % (ref 4.8–5.6)
HDLC SERPL-MCNC: 64 MG/DL (ref 40–60)
INTERPRETATION: NORMAL
LDLC SERPL CALC-MCNC: 159 MG/DL (ref 0–100)
Lab: NORMAL
POTASSIUM SERPL-SCNC: 4.5 MMOL/L (ref 3.5–5.2)
PROT SERPL-MCNC: 7.7 G/DL (ref 6–8.5)
SODIUM SERPL-SCNC: 141 MMOL/L (ref 136–145)
T3FREE SERPL-MCNC: 3.1 PG/ML (ref 2–4.4)
T4 FREE SERPL-MCNC: 1.25 NG/DL (ref 0.93–1.7)
T4 SERPL-MCNC: 6.94 MCG/DL (ref 4.5–11.7)
THYROGLOB AB SERPL-ACNC: 62 IU/ML
THYROGLOB SERPL-MCNC: 13 NG/ML
THYROGLOB SERPL-MCNC: ABNORMAL NG/ML
TRIGL SERPL-MCNC: 95 MG/DL (ref 0–150)
TSH SERPL DL<=0.005 MIU/L-ACNC: 3.04 MIU/ML (ref 0.27–4.2)
URATE SERPL-MCNC: 5.2 MG/DL (ref 2.4–5.7)
VLDLC SERPL CALC-MCNC: 19 MG/DL (ref 5–40)

## 2019-02-01 ENCOUNTER — APPOINTMENT (OUTPATIENT)
Dept: SLEEP MEDICINE | Facility: HOSPITAL | Age: 54
End: 2019-02-01
Attending: INTERNAL MEDICINE

## 2019-02-05 ENCOUNTER — OFFICE VISIT (OUTPATIENT)
Dept: ENDOCRINOLOGY | Age: 54
End: 2019-02-05

## 2019-02-05 VITALS
WEIGHT: 208 LBS | BODY MASS INDEX: 31.52 KG/M2 | SYSTOLIC BLOOD PRESSURE: 124 MMHG | DIASTOLIC BLOOD PRESSURE: 80 MMHG | HEIGHT: 68 IN

## 2019-02-05 DIAGNOSIS — E04.1 SOLITARY THYROID NODULE: ICD-10-CM

## 2019-02-05 DIAGNOSIS — R79.89 LFT ELEVATION: ICD-10-CM

## 2019-02-05 DIAGNOSIS — E03.9 ADULT HYPOTHYROIDISM: Primary | ICD-10-CM

## 2019-02-05 DIAGNOSIS — E78.5 HYPERLIPIDEMIA, UNSPECIFIED HYPERLIPIDEMIA TYPE: ICD-10-CM

## 2019-02-05 DIAGNOSIS — E55.9 AVITAMINOSIS D: ICD-10-CM

## 2019-02-05 DIAGNOSIS — R73.03 PREDIABETES: ICD-10-CM

## 2019-02-05 DIAGNOSIS — R00.2 PALPITATIONS: ICD-10-CM

## 2019-02-05 PROBLEM — G47.33 OSA (OBSTRUCTIVE SLEEP APNEA): Status: ACTIVE | Noted: 2019-02-05

## 2019-02-05 PROCEDURE — 99214 OFFICE O/P EST MOD 30 MIN: CPT | Performed by: NURSE PRACTITIONER

## 2019-02-05 RX ORDER — LEVOTHYROXINE SODIUM 100 MCG
100 TABLET ORAL DAILY
Qty: 90 TABLET | Refills: 1 | Status: SHIPPED | OUTPATIENT
Start: 2019-02-05 | End: 2019-09-13 | Stop reason: SDUPTHER

## 2019-02-05 RX ORDER — COLESEVELAM HYDROCHLORIDE 3.75 G/1
3.75 POWDER, FOR SUSPENSION ORAL DAILY
Qty: 30 EACH | Refills: 5 | Status: SHIPPED | OUTPATIENT
Start: 2019-02-05 | End: 2019-02-28

## 2019-02-05 RX ORDER — ERGOCALCIFEROL 1.25 MG/1
CAPSULE ORAL
Qty: 24 CAPSULE | Refills: 1
Start: 2019-02-05 | End: 2019-09-13 | Stop reason: SDUPTHER

## 2019-02-05 NOTE — PROGRESS NOTES
"Subjective   Theresa Palacio is a 53 y.o. female is here today for follow-up.  Chief Complaint   Patient presents with   • Hypothyroidism     recent labs, pt is not taking metoprolol   • Hyperlipidemia   • Goiter   • Vitamin D Deficiency   • Prediabetes     /80   Ht 172.7 cm (68\")   Wt 94.3 kg (208 lb)   BMI 31.63 kg/m²   .medis  Family History   Problem Relation Age of Onset   • Thyroid disease Mother    • Diabetes Father    • Stroke Father    • Stroke Maternal Grandmother    • Breast cancer Maternal Grandmother    • Stroke Maternal Grandfather      Social History     Tobacco Use   • Smoking status: Former Smoker     Years: 1.00   • Smokeless tobacco: Never Used   • Tobacco comment: 1 year back in high school    Substance Use Topics   • Alcohol use: Yes     Alcohol/week: 2.4 oz     Types: 4 Glasses of wine per week     Comment: Caffeine - 2 cups daily   • Drug use: Not on file     Allergies   Allergen Reactions   • Sulfa Antibiotics          History of Present Illness  Encounter Diagnoses   Name Primary?   • Hyperlipidemia, unspecified hyperlipidemia type Yes   • Avitaminosis D    • Adult hypothyroidism    • Solitary thyroid nodule    • LFT elevation    • Prediabetes    • Palpitations      53-year-old female patient here today for routine follow-up visit.  She is being seen for the above-mentioned problems.  She has a history of elevated liver enzymes and quite her recent labs her liver enzymes have gone up significantly.  Her last liver ultrasound was in 2016.  She is currently not taken metoprolol that was prescribed by another provider.  Her last thyroid ultrasound was reviewed.  She does have a 1 cm nodule that was unchanged at last check.  She has not been taking her Crestor as prescribed for the past month.  She was recently treated and diagnosed with obstructive sleep apnea.  She is using a CPAP machine and states she is starting to feel better after a few weeks of use.  She complains that she is " unable to lose weight.  She denies any signs and symptoms of hyper or hypothyroidism.  Her previous labs were lifted in comparison to previous studies.  We discussed the benefit of WelChol to lower her cholesterol  The following portions of the patient's history were reviewed and updated as appropriate: allergies, current medications, past family history, past medical history, past social history, past surgical history and problem list.    Review of Systems   Constitutional: Negative for fatigue.   HENT: Negative for trouble swallowing.    Eyes: Negative for visual disturbance.   Cardiovascular: Negative for leg swelling.   Endocrine: Negative for polyuria.   Skin: Negative for wound.   Neurological: Negative for numbness.       Objective   Physical Exam   Constitutional: She is oriented to person, place, and time. She appears well-developed and well-nourished. No distress.   HENT:   Head: Normocephalic and atraumatic.   Right Ear: External ear normal.   Left Ear: External ear normal.   Nose: Nose normal.   Mouth/Throat: Oropharynx is clear and moist. No oropharyngeal exudate.   Eyes: EOM are normal. Pupils are equal, round, and reactive to light. Right eye exhibits no discharge. Left eye exhibits no discharge.   Neck: Trachea normal, normal range of motion and full passive range of motion without pain. Neck supple. No tracheal tenderness present. Carotid bruit is not present. No tracheal deviation, no edema and no erythema present. No thyroid mass and no thyromegaly present.   Cardiovascular: Normal rate, regular rhythm, normal heart sounds and intact distal pulses. Exam reveals no gallop and no friction rub.   No murmur heard.  Pulmonary/Chest: Effort normal and breath sounds normal. No stridor. No respiratory distress. She has no wheezes. She has no rales.   Abdominal: Soft. Bowel sounds are normal. She exhibits no distension.   Musculoskeletal: Normal range of motion. She exhibits no edema or deformity.    Lymphadenopathy:     She has no cervical adenopathy.   Neurological: She is alert and oriented to person, place, and time.   Skin: Skin is warm and dry. No rash noted. She is not diaphoretic. No erythema. No pallor.   Psychiatric: She has a normal mood and affect. Her behavior is normal. Judgment and thought content normal.   Nursing note and vitals reviewed.      Results for orders placed or performed in visit on 01/14/19   C-Peptide   Result Value Ref Range    C-Peptide 3.4 1.1 - 4.4 ng/mL   Lipid Panel   Result Value Ref Range    Total Cholesterol 242 (H) 0 - 200 mg/dL    Triglycerides 95 0 - 150 mg/dL    HDL Cholesterol 64 (H) 40 - 60 mg/dL    VLDL Cholesterol 19 5 - 40 mg/dL    LDL Cholesterol  159 (H) 0 - 100 mg/dL   Hemoglobin A1c   Result Value Ref Range    Hemoglobin A1C 5.94 (H) 4.80 - 5.60 %   Vitamin D 25 Hydroxy   Result Value Ref Range    25 Hydroxy, Vitamin D 26.2 (L) 30.0 - 100.0 ng/ml   Uric Acid   Result Value Ref Range    Uric Acid 5.2 2.4 - 5.7 mg/dL   T4, Free   Result Value Ref Range    Free T4 1.25 0.93 - 1.70 ng/dL   T4 & TSH ([facility])   Result Value Ref Range    TSH 3.040 0.270 - 4.200 mIU/mL    T4, Total 6.94 4.50 - 11.70 mcg/dL   T3, Free   Result Value Ref Range    T3, Free 3.1 2.0 - 4.4 pg/mL   Comprehensive Thyroglobulin   Result Value Ref Range    Thyroglobulin Ab 62 (H) IU/mL    Thyroglobulin Comment ng/mL    Thyroglobulin (TG-YANY) 13 ng/mL   Comprehensive Metabolic Panel   Result Value Ref Range    Glucose 105 (H) 65 - 99 mg/dL    BUN 13 6 - 20 mg/dL    Creatinine 0.73 0.57 - 1.00 mg/dL    eGFR Non African Am 83 >60 mL/min/1.73    eGFR African Am 100 >60 mL/min/1.73    BUN/Creatinine Ratio 17.8 7.0 - 25.0    Sodium 141 136 - 145 mmol/L    Potassium 4.5 3.5 - 5.2 mmol/L    Chloride 102 98 - 107 mmol/L    Total CO2 26.7 22.0 - 29.0 mmol/L    Calcium 9.9 8.6 - 10.5 mg/dL    Total Protein 7.7 6.0 - 8.5 g/dL    Albumin 4.50 3.50 - 5.20 g/dL    Globulin 3.2 gm/dL    A/G Ratio 1.4 g/dL     Total Bilirubin 0.5 0.1 - 1.2 mg/dL    Alkaline Phosphatase 61 39 - 117 U/L    AST (SGOT) 90 (H) 1 - 32 U/L    ALT (SGPT) 141 (H) 1 - 33 U/L   Cardiovascular Risk Assessment   Result Value Ref Range    Interpretation Note    Diabetes Patient Education   Result Value Ref Range    PDF Image Not applicable        Assessment/Plan   Problems Addressed this Visit        Cardiovascular and Mediastinum    HLD (hyperlipidemia) - Primary    Palpitations       Digestive    Avitaminosis D       Endocrine    Adult hypothyroidism    Solitary thyroid nodule       Other    LFT elevation    Prediabetes        In summary, patient was seen and examined.  Metabolically she is stable.  She is being treated for obstructive sleep apnea and is feeling better with fatigue.  We've increased her Synthroid 200 µg and she will remain on name brand.  She will have a thyroid ultrasound later this year.  She has been taken off Crestor and put on WelChol 1 pack daily as directed.  Her thyroid ultrasound from last year was reviewed.  She will have a repeat liver ultrasound due to elevated liver enzymes.  She has been advised to stop alcohol and Tylenol and to stop statin therapy.  She will follow-up in 6 months with Dr. Sandy or myself with labs prior    Stop crestor  Liver u/s to be scheduled  Avoid alcohol and tylenol  welchol 1 pack daily as directed  Thyroid u/s this year  Increase synthroid to 100 mcg daily on empty stomach

## 2019-02-05 NOTE — PATIENT INSTRUCTIONS
Stop crestor  Liver u/s to be scheduled  Avoid alcohol and tylenol  welchol 1 pack daily as directed  Thyroid u/s this year  Increase synthroid to 100 mcg daily on empty stomach

## 2019-02-22 ENCOUNTER — HOSPITAL ENCOUNTER (OUTPATIENT)
Dept: ULTRASOUND IMAGING | Facility: HOSPITAL | Age: 54
Discharge: HOME | End: 2019-02-22
Admitting: NURSE PRACTITIONER

## 2019-02-22 DIAGNOSIS — R00.2 PALPITATIONS: ICD-10-CM

## 2019-02-22 DIAGNOSIS — E78.5 HYPERLIPIDEMIA, UNSPECIFIED HYPERLIPIDEMIA TYPE: ICD-10-CM

## 2019-02-22 DIAGNOSIS — E04.1 SOLITARY THYROID NODULE: ICD-10-CM

## 2019-02-22 DIAGNOSIS — E03.9 ADULT HYPOTHYROIDISM: ICD-10-CM

## 2019-02-22 DIAGNOSIS — R79.89 LFT ELEVATION: ICD-10-CM

## 2019-02-22 DIAGNOSIS — E55.9 AVITAMINOSIS D: ICD-10-CM

## 2019-02-22 DIAGNOSIS — R73.03 PREDIABETES: ICD-10-CM

## 2019-02-22 PROCEDURE — 76705 ECHO EXAM OF ABDOMEN: CPT

## 2019-02-25 PROBLEM — K76.0 FATTY LIVER: Status: ACTIVE | Noted: 2019-02-25

## 2019-02-28 ENCOUNTER — TELEPHONE (OUTPATIENT)
Dept: ENDOCRINOLOGY | Age: 54
End: 2019-02-28

## 2019-02-28 NOTE — TELEPHONE ENCOUNTER
Pt has been called and informed of results. She expressed understanding    ----- Message from Floerncia Gardner sent at 2/28/2019  9:21 AM EST -----  Contact: patient   Ultrasound of liver and gall bladder   Looking for the results    Please call [phone]

## 2019-03-01 ENCOUNTER — APPOINTMENT (OUTPATIENT)
Dept: SLEEP MEDICINE | Facility: HOSPITAL | Age: 54
End: 2019-03-01
Attending: INTERNAL MEDICINE

## 2019-04-18 RX ORDER — ERGOCALCIFEROL 1.25 MG/1
CAPSULE ORAL
Qty: 8 CAPSULE | Refills: 2 | Status: SHIPPED | OUTPATIENT
Start: 2019-04-18 | End: 2019-09-13 | Stop reason: ALTCHOICE

## 2019-07-12 ENCOUNTER — APPOINTMENT (OUTPATIENT)
Dept: WOMENS IMAGING | Facility: HOSPITAL | Age: 54
End: 2019-07-12

## 2019-07-12 PROCEDURE — 77063 BREAST TOMOSYNTHESIS BI: CPT | Performed by: RADIOLOGY

## 2019-07-12 PROCEDURE — 77067 SCR MAMMO BI INCL CAD: CPT | Performed by: RADIOLOGY

## 2019-08-21 DIAGNOSIS — E04.1 SOLITARY THYROID NODULE: ICD-10-CM

## 2019-08-21 DIAGNOSIS — E78.5 HYPERLIPIDEMIA, UNSPECIFIED HYPERLIPIDEMIA TYPE: Primary | ICD-10-CM

## 2019-08-21 DIAGNOSIS — E03.9 ADULT HYPOTHYROIDISM: ICD-10-CM

## 2019-08-21 DIAGNOSIS — R53.82 CHRONIC FATIGUE: ICD-10-CM

## 2019-08-21 DIAGNOSIS — E55.9 AVITAMINOSIS D: ICD-10-CM

## 2019-08-21 DIAGNOSIS — E04.9 GOITER, NON-TOXIC: ICD-10-CM

## 2019-08-21 DIAGNOSIS — R73.03 PREDIABETES: ICD-10-CM

## 2019-09-05 ENCOUNTER — LAB (OUTPATIENT)
Dept: ENDOCRINOLOGY | Age: 54
End: 2019-09-05

## 2019-09-05 DIAGNOSIS — E78.5 HYPERLIPIDEMIA, UNSPECIFIED HYPERLIPIDEMIA TYPE: ICD-10-CM

## 2019-09-05 DIAGNOSIS — E55.9 AVITAMINOSIS D: ICD-10-CM

## 2019-09-05 DIAGNOSIS — R73.03 PREDIABETES: ICD-10-CM

## 2019-09-05 DIAGNOSIS — E03.9 ADULT HYPOTHYROIDISM: ICD-10-CM

## 2019-09-05 DIAGNOSIS — E04.1 SOLITARY THYROID NODULE: ICD-10-CM

## 2019-09-05 DIAGNOSIS — E04.9 GOITER, NON-TOXIC: ICD-10-CM

## 2019-09-05 DIAGNOSIS — R53.82 CHRONIC FATIGUE: ICD-10-CM

## 2019-09-10 LAB
25(OH)D3+25(OH)D2 SERPL-MCNC: 33.6 NG/ML (ref 30–100)
ALBUMIN SERPL-MCNC: 4.6 G/DL (ref 3.5–5.2)
ALBUMIN/GLOB SERPL: 1.8 G/DL
ALP SERPL-CCNC: 57 U/L (ref 39–117)
ALT SERPL-CCNC: 69 U/L (ref 1–33)
AST SERPL-CCNC: 44 U/L (ref 1–32)
BILIRUB SERPL-MCNC: 0.4 MG/DL (ref 0.2–1.2)
BUN SERPL-MCNC: 15 MG/DL (ref 6–20)
BUN/CREAT SERPL: 22.7 (ref 7–25)
C PEPTIDE SERPL-MCNC: 3.7 NG/ML (ref 1.1–4.4)
CALCIUM SERPL-MCNC: 9.6 MG/DL (ref 8.6–10.5)
CHLORIDE SERPL-SCNC: 104 MMOL/L (ref 98–107)
CHOLEST SERPL-MCNC: 238 MG/DL (ref 0–200)
CO2 SERPL-SCNC: 28.4 MMOL/L (ref 22–29)
CREAT SERPL-MCNC: 0.66 MG/DL (ref 0.57–1)
GLOBULIN SER CALC-MCNC: 2.5 GM/DL
GLUCOSE SERPL-MCNC: 106 MG/DL (ref 65–99)
HBA1C MFR BLD: 6.2 % (ref 4.8–5.6)
HDLC SERPL-MCNC: 55 MG/DL (ref 40–60)
INTERPRETATION: NORMAL
LDLC SERPL CALC-MCNC: 164 MG/DL (ref 0–100)
Lab: NORMAL
POTASSIUM SERPL-SCNC: 4.9 MMOL/L (ref 3.5–5.2)
PROT SERPL-MCNC: 7.1 G/DL (ref 6–8.5)
SODIUM SERPL-SCNC: 144 MMOL/L (ref 136–145)
T3FREE SERPL-MCNC: 3.1 PG/ML (ref 2–4.4)
T4 FREE SERPL-MCNC: 1.35 NG/DL (ref 0.93–1.7)
T4 SERPL-MCNC: 7.81 MCG/DL (ref 4.5–11.7)
THYROGLOB AB SERPL-ACNC: 81 IU/ML
THYROGLOB SERPL-MCNC: 18 NG/ML
THYROGLOB SERPL-MCNC: ABNORMAL NG/ML
TRIGL SERPL-MCNC: 93 MG/DL (ref 0–150)
TSH SERPL DL<=0.005 MIU/L-ACNC: 2.36 UIU/ML (ref 0.27–4.2)
URATE SERPL-MCNC: 5.7 MG/DL (ref 2.4–5.7)
VLDLC SERPL CALC-MCNC: 18.6 MG/DL

## 2019-09-13 ENCOUNTER — TELEPHONE (OUTPATIENT)
Dept: ENDOCRINOLOGY | Age: 54
End: 2019-09-13

## 2019-09-13 ENCOUNTER — OFFICE VISIT (OUTPATIENT)
Dept: ENDOCRINOLOGY | Age: 54
End: 2019-09-13

## 2019-09-13 VITALS
HEIGHT: 68 IN | BODY MASS INDEX: 31.34 KG/M2 | DIASTOLIC BLOOD PRESSURE: 78 MMHG | SYSTOLIC BLOOD PRESSURE: 122 MMHG | RESPIRATION RATE: 16 BRPM | WEIGHT: 206.8 LBS

## 2019-09-13 DIAGNOSIS — K76.0 FATTY LIVER: ICD-10-CM

## 2019-09-13 DIAGNOSIS — R73.03 PREDIABETES: ICD-10-CM

## 2019-09-13 DIAGNOSIS — E04.1 SOLITARY THYROID NODULE: ICD-10-CM

## 2019-09-13 DIAGNOSIS — R53.82 CHRONIC FATIGUE: ICD-10-CM

## 2019-09-13 DIAGNOSIS — E78.2 MIXED HYPERLIPIDEMIA: ICD-10-CM

## 2019-09-13 DIAGNOSIS — E03.9 ADULT HYPOTHYROIDISM: Primary | ICD-10-CM

## 2019-09-13 DIAGNOSIS — E55.9 AVITAMINOSIS D: ICD-10-CM

## 2019-09-13 DIAGNOSIS — R00.2 PALPITATIONS: ICD-10-CM

## 2019-09-13 PROCEDURE — 99214 OFFICE O/P EST MOD 30 MIN: CPT | Performed by: INTERNAL MEDICINE

## 2019-09-13 RX ORDER — LEVOTHYROXINE SODIUM 100 MCG
100 TABLET ORAL DAILY
Qty: 90 TABLET | Refills: 3 | Status: SHIPPED | OUTPATIENT
Start: 2019-09-13 | End: 2021-12-27 | Stop reason: SDUPTHER

## 2019-09-13 RX ORDER — ERGOCALCIFEROL 1.25 MG/1
CAPSULE ORAL
Qty: 39 CAPSULE | Refills: 3
Start: 2019-09-13 | End: 2019-09-25 | Stop reason: SDUPTHER

## 2019-09-13 RX ORDER — ROSUVASTATIN CALCIUM 20 MG/1
20 TABLET, COATED ORAL NIGHTLY
Qty: 90 TABLET | Refills: 3 | Status: SHIPPED | OUTPATIENT
Start: 2019-09-13 | End: 2020-09-12

## 2019-09-13 RX ORDER — PANTOPRAZOLE SODIUM 40 MG/1
TABLET, DELAYED RELEASE ORAL
COMMUNITY
Start: 2019-08-30 | End: 2021-12-27

## 2019-09-13 RX ORDER — ERGOCALCIFEROL 1.25 MG/1
CAPSULE ORAL
Qty: 39 CAPSULE | Refills: 3
Start: 2019-09-13 | End: 2019-09-13 | Stop reason: SDUPTHER

## 2019-09-13 RX ORDER — METOPROLOL SUCCINATE 25 MG/1
25 TABLET, EXTENDED RELEASE ORAL DAILY
Qty: 90 TABLET | Refills: 3 | Status: SHIPPED | OUTPATIENT
Start: 2019-09-13 | End: 2021-12-27

## 2019-09-13 NOTE — PROGRESS NOTES
"Subjective   Theresa Palacio is a 54 y.o. female seen for follow up for hyperlipidemia, hypothyroidism, vit d deficiency, lab review.    History of Present Illness this is a 54-year-old female known patient with hypothyroidism and goiter as well as vitamin D deficiency and dyslipidemia and fatty liver and prediabetes..  Over the course of last 6 months she has had no significant health problems for which to go to the ER or hospital.     /78   Resp 16   Ht 172.7 cm (68\")   Wt 93.8 kg (206 lb 12.8 oz)   BMI 31.44 kg/m²      Allergies   Allergen Reactions   • Sulfa Antibiotics        Current Outpatient Medications:   •  metoprolol succinate XL (TOPROL XL) 25 MG 24 hr tablet, Take 1 tablet by mouth Daily., Disp: 30 tablet, Rfl: 0  •  pantoprazole (PROTONIX) 40 MG EC tablet, , Disp: , Rfl:   •  SYNTHROID 100 MCG tablet, Take 1 tablet by mouth Daily., Disp: 90 tablet, Rfl: 1  •  vitamin D (ERGOCALCIFEROL) 92791 units capsule capsule, Take 1 cap twice weekly, Disp: 24 capsule, Rfl: 1      The following portions of the patient's history were reviewed and updated as appropriate: allergies, current medications, past family history, past medical history, past social history, past surgical history and problem list.    Review of Systems   Constitutional: Negative.    HENT: Negative.    Eyes: Negative.    Respiratory: Negative.    Cardiovascular: Negative.    Gastrointestinal: Negative.    Endocrine: Negative.    Genitourinary: Negative.    Musculoskeletal: Negative.    Skin: Negative.    Allergic/Immunologic: Negative.    Neurological: Negative.    Hematological: Negative.    Psychiatric/Behavioral: Negative.        Objective   Physical Exam   Constitutional: She is oriented to person, place, and time. She appears well-developed and well-nourished. No distress.   HENT:   Head: Normocephalic and atraumatic.   Right Ear: External ear normal.   Left Ear: External ear normal.   Nose: Nose normal.   Mouth/Throat: " Oropharynx is clear and moist. No oropharyngeal exudate.   Eyes: Conjunctivae and EOM are normal. Pupils are equal, round, and reactive to light. Right eye exhibits no discharge. Left eye exhibits no discharge. No scleral icterus.   Neck: Trachea normal, normal range of motion and full passive range of motion without pain. Neck supple. No JVD present. No tracheal tenderness present. Carotid bruit is not present. No tracheal deviation, no edema and no erythema present. No thyroid mass and no thyromegaly present.   Cardiovascular: Normal rate, regular rhythm, normal heart sounds and intact distal pulses. Exam reveals no gallop and no friction rub.   No murmur heard.  Pulmonary/Chest: Effort normal and breath sounds normal. No stridor. No respiratory distress. She has no wheezes. She has no rales. She exhibits no tenderness.   Abdominal: Soft. Bowel sounds are normal. She exhibits no distension and no mass. There is no tenderness. There is no rebound and no guarding. No hernia.   Musculoskeletal: Normal range of motion. She exhibits no edema, tenderness or deformity.   Lymphadenopathy:     She has no cervical adenopathy.   Neurological: She is alert and oriented to person, place, and time. She has normal reflexes. She displays normal reflexes. No cranial nerve deficit or sensory deficit. She exhibits normal muscle tone. Coordination normal.   Skin: Skin is warm and dry. No rash noted. She is not diaphoretic. No erythema. No pallor.   Psychiatric: She has a normal mood and affect. Her behavior is normal. Judgment and thought content normal.   Nursing note and vitals reviewed.       Results for orders placed or performed in visit on 09/05/19   Vitamin D 25 Hydroxy   Result Value Ref Range    25 Hydroxy, Vitamin D 33.6 30.0 - 100.0 ng/mL   Uric Acid   Result Value Ref Range    Uric Acid 5.7 2.4 - 5.7 mg/dL   Lipid Panel   Result Value Ref Range    Total Cholesterol 238 (H) 0 - 200 mg/dL    Triglycerides 93 0 - 150 mg/dL     HDL Cholesterol 55 40 - 60 mg/dL    VLDL Cholesterol 18.6 mg/dL    LDL Cholesterol  164 (H) 0 - 100 mg/dL   Hemoglobin A1c   Result Value Ref Range    Hemoglobin A1C 6.20 (H) 4.80 - 5.60 %   C-Peptide   Result Value Ref Range    C-Peptide 3.7 1.1 - 4.4 ng/mL   T4, Free   Result Value Ref Range    Free T4 1.35 0.93 - 1.70 ng/dL   T4 & TSH ([facility])   Result Value Ref Range    TSH 2.360 0.270 - 4.200 uIU/mL    T4, Total 7.81 4.50 - 11.70 mcg/dL   T3, Free   Result Value Ref Range    T3, Free 3.1 2.0 - 4.4 pg/mL   Comprehensive Thyroglobulin   Result Value Ref Range    Thyroglobulin Ab 81 (H) IU/mL    Thyroglobulin Comment ng/mL    Thyroglobulin (TG-YANY) 18 ng/mL   Comprehensive Metabolic Panel   Result Value Ref Range    Glucose 106 (H) 65 - 99 mg/dL    BUN 15 6 - 20 mg/dL    Creatinine 0.66 0.57 - 1.00 mg/dL    eGFR Non African Am 93 >60 mL/min/1.73    eGFR African Am 112 >60 mL/min/1.73    BUN/Creatinine Ratio 22.7 7.0 - 25.0    Sodium 144 136 - 145 mmol/L    Potassium 4.9 3.5 - 5.2 mmol/L    Chloride 104 98 - 107 mmol/L    Total CO2 28.4 22.0 - 29.0 mmol/L    Calcium 9.6 8.6 - 10.5 mg/dL    Total Protein 7.1 6.0 - 8.5 g/dL    Albumin 4.60 3.50 - 5.20 g/dL    Globulin 2.5 gm/dL    A/G Ratio 1.8 g/dL    Total Bilirubin 0.4 0.2 - 1.2 mg/dL    Alkaline Phosphatase 57 39 - 117 U/L    AST (SGOT) 44 (H) 1 - 32 U/L    ALT (SGPT) 69 (H) 1 - 33 U/L   Cardiovascular Risk Assessment   Result Value Ref Range    Interpretation Note    Diabetes Patient Education   Result Value Ref Range    PDF Image Not applicable          Assessment/Plan   Diagnoses and all orders for this visit:    Adult hypothyroidism  -     T4 & TSH ([facility]); Future  -     T3, Free; Future  -     T4, Free; Future  -     Thyroglobulin With Anti-TG; Future  -     Uric Acid; Future  -     Vitamin D 25 Hydroxy; Future  -     Comprehensive Metabolic Panel; Future  -     C-Peptide; Future  -     Hemoglobin A1c; Future  -     MicroAlbumin, Urine, Random - Urine,  Clean Catch; Future  -     NMR LipoProfile; Future    Solitary thyroid nodule  -     T4 & TSH ([facility]); Future  -     T3, Free; Future  -     T4, Free; Future  -     Thyroglobulin With Anti-TG; Future  -     Uric Acid; Future  -     Vitamin D 25 Hydroxy; Future  -     Comprehensive Metabolic Panel; Future  -     C-Peptide; Future  -     Hemoglobin A1c; Future  -     MicroAlbumin, Urine, Random - Urine, Clean Catch; Future  -     NMR LipoProfile; Future    Chronic fatigue  -     T4 & TSH ([facility]); Future  -     T3, Free; Future  -     T4, Free; Future  -     Thyroglobulin With Anti-TG; Future  -     Uric Acid; Future  -     Vitamin D 25 Hydroxy; Future  -     Comprehensive Metabolic Panel; Future  -     C-Peptide; Future  -     Hemoglobin A1c; Future  -     MicroAlbumin, Urine, Random - Urine, Clean Catch; Future  -     NMR LipoProfile; Future    Prediabetes  -     T4 & TSH ([facility]); Future  -     T3, Free; Future  -     T4, Free; Future  -     Thyroglobulin With Anti-TG; Future  -     Uric Acid; Future  -     Vitamin D 25 Hydroxy; Future  -     Comprehensive Metabolic Panel; Future  -     C-Peptide; Future  -     Hemoglobin A1c; Future  -     MicroAlbumin, Urine, Random - Urine, Clean Catch; Future  -     NMR LipoProfile; Future    Mixed hyperlipidemia  -     T4 & TSH ([facility]); Future  -     T3, Free; Future  -     T4, Free; Future  -     Thyroglobulin With Anti-TG; Future  -     Uric Acid; Future  -     Vitamin D 25 Hydroxy; Future  -     Comprehensive Metabolic Panel; Future  -     C-Peptide; Future  -     Hemoglobin A1c; Future  -     MicroAlbumin, Urine, Random - Urine, Clean Catch; Future  -     NMR LipoProfile; Future    Avitaminosis D  -     T4 & TSH ([facility]); Future  -     T3, Free; Future  -     T4, Free; Future  -     Thyroglobulin With Anti-TG; Future  -     Uric Acid; Future  -     Vitamin D 25 Hydroxy; Future  -     Comprehensive Metabolic Panel; Future  -     C-Peptide; Future  -     Hemoglobin  A1c; Future  -     MicroAlbumin, Urine, Random - Urine, Clean Catch; Future  -     NMR LipoProfile; Future    Fatty liver  -     T4 & TSH ([facility]); Future  -     T3, Free; Future  -     T4, Free; Future  -     Thyroglobulin With Anti-TG; Future  -     Uric Acid; Future  -     Vitamin D 25 Hydroxy; Future  -     Comprehensive Metabolic Panel; Future  -     C-Peptide; Future  -     Hemoglobin A1c; Future  -     MicroAlbumin, Urine, Random - Urine, Clean Catch; Future  -     NMR LipoProfile; Future    Palpitations  -     metoprolol succinate XL (TOPROL XL) 25 MG 24 hr tablet; Take 1 tablet by mouth Daily.  -     T4 & TSH ([facility]); Future  -     T3, Free; Future  -     T4, Free; Future  -     Thyroglobulin With Anti-TG; Future  -     Uric Acid; Future  -     Vitamin D 25 Hydroxy; Future  -     Comprehensive Metabolic Panel; Future  -     C-Peptide; Future  -     Hemoglobin A1c; Future  -     MicroAlbumin, Urine, Random - Urine, Clean Catch; Future  -     NMR LipoProfile; Future    Other orders  -     vitamin D (ERGOCALCIFEROL) 70960 units capsule capsule; Take 1 cap 3 times weekly  -     SYNTHROID 100 MCG tablet; Take 1 tablet by mouth Daily.  -     rosuvastatin (CRESTOR) 20 MG tablet; Take 1 tablet by mouth Every Night.  -     metFORMIN (GLUCOPHAGE) 850 MG tablet; Take 1 tablet by mouth 2 (Two) Times a Day With Meals.      In the summary I saw and examined this 54-year-old female for above-mentioned problems.  I reviewed her laboratory evaluation of 5/9/2019 and provided her with a hard copy of it.  Aside from her cholesterol and LDL and ALT and AST elevations she is otherwise clinically and metabolically stable and therefore we will go ahead and continue her prescriptions.  However I am going to go ahead and start her on Crestor 20 mg daily and metformin 850 mg twice daily.  She will see Ms. Georgina Miller in 6 months or sooner if needed with laboratory evaluation prior to each office visit.

## 2019-09-13 NOTE — TELEPHONE ENCOUNTER
Patient said she went to [facility] today to  her scripts and they did not have a script on file for Vitamin D. She said Dr. Sandy told her he was going to increase to 3xweek. I told her I can see the script from today but cannot see receipt that it was received by pharmacy.   Pt [phone]

## 2019-09-23 RX ORDER — ERGOCALCIFEROL 1.25 MG/1
CAPSULE ORAL
Qty: 8 CAPSULE | Refills: 1 | OUTPATIENT
Start: 2019-09-23

## 2019-09-25 ENCOUNTER — TELEPHONE (OUTPATIENT)
Dept: ENDOCRINOLOGY | Age: 54
End: 2019-09-25

## 2019-09-25 RX ORDER — ERGOCALCIFEROL 1.25 MG/1
CAPSULE ORAL
Qty: 39 CAPSULE | Refills: 3 | Status: SHIPPED | OUTPATIENT
Start: 2019-09-25 | End: 2020-10-21

## 2019-09-25 NOTE — TELEPHONE ENCOUNTER
Patient said [facility] [phone] texted her and said Vitamin D was denied.      She said Dr. Sandy told her on her visit on 9-13-19 to increase vitamin D to 3 x week.  I told her I can see Dr. Sandy's script in her chart but I cannot see that it was sent to the pharmacy.   I told her I can see that the refill request on 9-23-19 says it was refused.  Pt - [phone] asked for call back and said she called about the Vitamin D. I can see message sent on 9-13-19

## 2020-03-03 ENCOUNTER — RESULTS ENCOUNTER (OUTPATIENT)
Dept: ENDOCRINOLOGY | Age: 55
End: 2020-03-03

## 2020-03-03 DIAGNOSIS — E78.2 MIXED HYPERLIPIDEMIA: ICD-10-CM

## 2020-03-03 DIAGNOSIS — R53.82 CHRONIC FATIGUE: ICD-10-CM

## 2020-03-03 DIAGNOSIS — R73.03 PREDIABETES: ICD-10-CM

## 2020-03-03 DIAGNOSIS — K76.0 FATTY LIVER: ICD-10-CM

## 2020-03-03 DIAGNOSIS — R00.2 PALPITATIONS: ICD-10-CM

## 2020-03-03 DIAGNOSIS — E04.1 SOLITARY THYROID NODULE: ICD-10-CM

## 2020-03-03 DIAGNOSIS — E55.9 AVITAMINOSIS D: ICD-10-CM

## 2020-03-03 DIAGNOSIS — E03.9 ADULT HYPOTHYROIDISM: ICD-10-CM

## 2020-03-15 LAB
25(OH)D3+25(OH)D2 SERPL-MCNC: 29.2 NG/ML (ref 30–100)
ALBUMIN SERPL-MCNC: 4.6 G/DL (ref 3.5–5.2)
ALBUMIN/GLOB SERPL: 1.8 G/DL
ALP SERPL-CCNC: 63 U/L (ref 39–117)
ALT SERPL-CCNC: 81 U/L (ref 1–33)
AST SERPL-CCNC: 48 U/L (ref 1–32)
BILIRUB SERPL-MCNC: 0.4 MG/DL (ref 0.2–1.2)
BUN SERPL-MCNC: 10 MG/DL (ref 6–20)
BUN/CREAT SERPL: 12.3 (ref 7–25)
C PEPTIDE SERPL-MCNC: 3.7 NG/ML (ref 1.1–4.4)
CALCIUM SERPL-MCNC: 9.7 MG/DL (ref 8.6–10.5)
CHLORIDE SERPL-SCNC: 104 MMOL/L (ref 98–107)
CHOLEST SERPL-MCNC: 202 MG/DL (ref 100–199)
CO2 SERPL-SCNC: 25.7 MMOL/L (ref 22–29)
CREAT SERPL-MCNC: 0.81 MG/DL (ref 0.57–1)
GLOBULIN SER CALC-MCNC: 2.6 GM/DL
GLUCOSE SERPL-MCNC: 102 MG/DL (ref 65–99)
HBA1C MFR BLD: 5.9 % (ref 4.8–5.6)
HDL SERPL-SCNC: 43.2 UMOL/L
HDLC SERPL-MCNC: 66 MG/DL
LDL SERPL QN: 21 NM
LDL SERPL-SCNC: 1558 NMOL/L
LDL SMALL SERPL-SCNC: 690 NMOL/L
LDLC SERPL CALC-MCNC: 115 MG/DL (ref 0–99)
MICROALBUMIN UR-MCNC: 10.5 UG/ML
POTASSIUM SERPL-SCNC: 4.9 MMOL/L (ref 3.5–5.2)
PROT SERPL-MCNC: 7.2 G/DL (ref 6–8.5)
SODIUM SERPL-SCNC: 142 MMOL/L (ref 136–145)
T3FREE SERPL-MCNC: 3.2 PG/ML (ref 2–4.4)
T4 FREE SERPL-MCNC: 1.27 NG/DL (ref 0.93–1.7)
T4 SERPL-MCNC: 7.47 MCG/DL (ref 4.5–11.7)
THYROGLOB AB SERPL-ACNC: 40 IU/ML (ref 0–0.9)
THYROGLOB SERPL-MCNC: 27 NG/ML
TRIGL SERPL-MCNC: 105 MG/DL (ref 0–149)
TSH SERPL DL<=0.005 MIU/L-ACNC: 2.41 UIU/ML (ref 0.27–4.2)
URATE SERPL-MCNC: 5.5 MG/DL (ref 2.4–5.7)

## 2020-09-09 ENCOUNTER — APPOINTMENT (OUTPATIENT)
Dept: SLEEP MEDICINE | Facility: HOSPITAL | Age: 55
End: 2020-09-09

## 2020-09-10 ENCOUNTER — OFFICE VISIT (OUTPATIENT)
Dept: SLEEP MEDICINE | Facility: HOSPITAL | Age: 55
End: 2020-09-10

## 2020-09-10 VITALS
HEIGHT: 68 IN | BODY MASS INDEX: 31.61 KG/M2 | SYSTOLIC BLOOD PRESSURE: 141 MMHG | HEART RATE: 94 BPM | DIASTOLIC BLOOD PRESSURE: 89 MMHG | OXYGEN SATURATION: 97 % | WEIGHT: 208.6 LBS

## 2020-09-10 DIAGNOSIS — R14.0 BLOATING: ICD-10-CM

## 2020-09-10 DIAGNOSIS — G47.33 OSA (OBSTRUCTIVE SLEEP APNEA): Primary | ICD-10-CM

## 2020-09-10 PROCEDURE — G0463 HOSPITAL OUTPT CLINIC VISIT: HCPCS

## 2020-09-10 NOTE — PROGRESS NOTES
[facility]  Telephone: [phone] / Fax: [phone] [place]  Telephone: [phone] / Fax: [phone] Dianelys Shah    Referring Physician:Ann Garza MD  PCP: Ann Garza MD    Reason for consult:  sleep apnea    Theresa Palacio is a 55 y.o.female  was seen in the [facility] today for sleep apnea. She was diagnosed with KAYLI 2 years ago and has been on the machine ever since. She feels bloated when she wakes up. If she does not use her machine for a few days, bloating goes away.  She wears a nasal mask. It fits well. She denies significant air leak. However, her mask is old/stretched/and needs replacement. She needs an updated order for CPAP supplies. Her bedtime is 10pm-6am. She falls asleep within 10-15 minutes. She wakes up feeling tired in the morning.      SH- business owner, 4 alc per week, 2 coffee per day.    ROS- +myalgias, rest is negative.    Theresa Palacio  has a past medical history of Fatigue, Hyperlipidemia, Hypothyroidism, LFT elevation, Prediabetes, and Vitamin D deficiency.    Current Medications:    Current Outpatient Medications:   •  metFORMIN (GLUCOPHAGE) 850 MG tablet, Take 1 tablet by mouth 2 (Two) Times a Day With Meals., Disp: 180 tablet, Rfl: 3  •  metoprolol succinate XL (TOPROL XL) 25 MG 24 hr tablet, Take 1 tablet by mouth Daily., Disp: 90 tablet, Rfl: 3  •  pantoprazole (PROTONIX) 40 MG EC tablet, , Disp: , Rfl:   •  rosuvastatin (CRESTOR) 20 MG tablet, Take 1 tablet by mouth Every Night., Disp: 90 tablet, Rfl: 3  •  SYNTHROID 100 MCG tablet, Take 1 tablet by mouth Daily., Disp: 90 tablet, Rfl: 3  •  vitamin D (ERGOCALCIFEROL) 78512 units capsule capsule, Take 1 cap 3 times weekly, Disp: 39 capsule, Rfl: 3    I have reviewed Past Medical History, Past Surgical History, Medication List, Social History and Family History as entered in Sleep Questionnaire and EPIC.    ESS  4   Vital Signs /89   Pulse 94    "Ht 172.7 cm (68\")   Wt 94.6 kg (208 lb 9.6 oz)   SpO2 97%   BMI 31.72 kg/m²  Body mass index is 31.72 kg/m².    General Alert and oriented. No acute distress noted   Pharynx/Throat Class IV Mallampati airway, large tongue, no evidence of redundant lateral pharyngeal tissue. No oral lesions. No thrush. Moist mucous membranes.   Head Normocephalic. Symmetrical. Atraumatic.    Nose No septal deviation. No drainage   Chest Wall Normal shape. Symmetric expansion with respiration. No tenderness.   Neck Trachea midline, no thyromegaly or adenopathy    Lungs Clear to auscultation bilaterally. No wheezes. No rhonchi. No rales. Respirations regular, even and unlabored.   Heart Regular rhythm and normal rate. Normal S1 and S2. No murmur   Abdomen Soft, non-tender and non-distended. Normal bowel sounds. No masses.   Extremities Moves all extremities well. No edema   Psychiatric Normal mood and affect.       HST-  9/25/18- [place]- AHI 46, lowest desat 79%      Download dates 5/14/20-8/11/20- avg nightly use of 3 hours and 45 minutes on auto CPAP 5-20cm with avg pr 8.5cm, AHI 0.8       Impression:  1. KAYLI (obstructive sleep apnea)    2. Bloating          Plan:  I will decrease the auto CPAP pressures to 5-10cm in vew of bloating. Send order to [facility] CPAP supplies. I advised patient to resume CPAP use and use the machine at least 4 hours, on most nights of the week. She has severe KAYLI and is at risk for complications if KAYLI is untreated.  I reviewed download report and original sleep study report with patient today.     She will f/u with Dr Murdock in 6 months.      I appreciate the opportunity to participate in this patient's care.      CHELLE Lux  [facility]  Phone: [phone]      Part of this note may be an electronic transcription/translation of spoken language to printed text using the Dragon Dictation System. Some errors may exist even though the document was edited.      "

## 2020-10-21 RX ORDER — ERGOCALCIFEROL 1.25 MG/1
CAPSULE ORAL
Qty: 12 CAPSULE | Refills: 0 | Status: SHIPPED | OUTPATIENT
Start: 2020-10-21 | End: 2020-12-16

## 2020-12-14 ENCOUNTER — RESULTS ENCOUNTER (OUTPATIENT)
Dept: ENDOCRINOLOGY | Age: 55
End: 2020-12-14

## 2020-12-14 DIAGNOSIS — E04.9 GOITER, NON-TOXIC: ICD-10-CM

## 2020-12-14 DIAGNOSIS — E78.2 MIXED HYPERLIPIDEMIA: Primary | ICD-10-CM

## 2020-12-14 DIAGNOSIS — E78.2 MIXED HYPERLIPIDEMIA: ICD-10-CM

## 2020-12-14 DIAGNOSIS — R79.89 LFT ELEVATION: ICD-10-CM

## 2020-12-14 DIAGNOSIS — E03.9 ADULT HYPOTHYROIDISM: ICD-10-CM

## 2020-12-14 DIAGNOSIS — E55.9 AVITAMINOSIS D: ICD-10-CM

## 2020-12-16 RX ORDER — ERGOCALCIFEROL 1.25 MG/1
CAPSULE ORAL
Qty: 12 CAPSULE | Refills: 0 | Status: SHIPPED | OUTPATIENT
Start: 2020-12-16 | End: 2020-12-28 | Stop reason: SDUPTHER

## 2020-12-19 ENCOUNTER — RESULTS ENCOUNTER (OUTPATIENT)
Dept: ENDOCRINOLOGY | Age: 55
End: 2020-12-19

## 2020-12-19 DIAGNOSIS — E04.9 GOITER, NON-TOXIC: ICD-10-CM

## 2020-12-19 DIAGNOSIS — R79.89 LFT ELEVATION: ICD-10-CM

## 2020-12-19 DIAGNOSIS — E03.9 ADULT HYPOTHYROIDISM: ICD-10-CM

## 2020-12-19 DIAGNOSIS — E55.9 AVITAMINOSIS D: ICD-10-CM

## 2020-12-19 DIAGNOSIS — E78.2 MIXED HYPERLIPIDEMIA: ICD-10-CM

## 2020-12-28 ENCOUNTER — TELEPHONE (OUTPATIENT)
Dept: ENDOCRINOLOGY | Age: 55
End: 2020-12-28

## 2020-12-28 ENCOUNTER — OFFICE VISIT (OUTPATIENT)
Dept: ENDOCRINOLOGY | Age: 55
End: 2020-12-28

## 2020-12-28 DIAGNOSIS — E55.9 AVITAMINOSIS D: ICD-10-CM

## 2020-12-28 DIAGNOSIS — E03.9 ADULT HYPOTHYROIDISM: Primary | ICD-10-CM

## 2020-12-28 DIAGNOSIS — R73.03 PREDIABETES: ICD-10-CM

## 2020-12-28 DIAGNOSIS — E04.1 SOLITARY THYROID NODULE: ICD-10-CM

## 2020-12-28 DIAGNOSIS — E78.2 MIXED HYPERLIPIDEMIA: ICD-10-CM

## 2020-12-28 PROCEDURE — 99442 PR PHYS/QHP TELEPHONE EVALUATION 11-20 MIN: CPT | Performed by: NURSE PRACTITIONER

## 2020-12-28 RX ORDER — ERGOCALCIFEROL 1.25 MG/1
CAPSULE ORAL
Qty: 12 CAPSULE | Refills: 1 | Status: SHIPPED | OUTPATIENT
Start: 2020-12-28 | End: 2021-09-24 | Stop reason: SDUPTHER

## 2020-12-28 RX ORDER — LEVOTHYROXINE SODIUM 0.12 MG/1
125 TABLET ORAL DAILY
Qty: 90 TABLET | Refills: 2 | Status: SHIPPED | OUTPATIENT
Start: 2020-12-28 | End: 2022-02-25 | Stop reason: SDUPTHER

## 2020-12-28 RX ORDER — LEVOTHYROXINE SODIUM 0.12 MG/1
125 TABLET ORAL DAILY
COMMUNITY
End: 2020-12-28 | Stop reason: SDUPTHER

## 2020-12-28 NOTE — PATIENT INSTRUCTIONS
Follow up in 6 mo  Metformin listed as an allergy  Continue all current meds  We will request labs from your pcp.

## 2021-02-04 ENCOUNTER — APPOINTMENT (OUTPATIENT)
Dept: WOMENS IMAGING | Facility: HOSPITAL | Age: 56
End: 2021-02-04

## 2021-02-04 PROCEDURE — 77067 SCR MAMMO BI INCL CAD: CPT | Performed by: RADIOLOGY

## 2021-02-04 PROCEDURE — 77063 BREAST TOMOSYNTHESIS BI: CPT | Performed by: RADIOLOGY

## 2021-03-02 ENCOUNTER — APPOINTMENT (OUTPATIENT)
Dept: WOMENS IMAGING | Facility: HOSPITAL | Age: 56
End: 2021-03-02

## 2021-03-02 PROCEDURE — 76641 ULTRASOUND BREAST COMPLETE: CPT | Performed by: RADIOLOGY

## 2021-03-02 PROCEDURE — G0279 TOMOSYNTHESIS, MAMMO: HCPCS | Performed by: RADIOLOGY

## 2021-03-02 PROCEDURE — 77065 DX MAMMO INCL CAD UNI: CPT | Performed by: RADIOLOGY

## 2021-03-02 PROCEDURE — 77061 BREAST TOMOSYNTHESIS UNI: CPT | Performed by: RADIOLOGY

## 2021-03-19 ENCOUNTER — APPOINTMENT (OUTPATIENT)
Dept: SLEEP MEDICINE | Facility: HOSPITAL | Age: 56
End: 2021-03-19

## 2021-03-26 ENCOUNTER — IMMUNIZATION (OUTPATIENT)
Dept: VACCINE CLINIC | Facility: HOSPITAL | Age: 56
End: 2021-03-26

## 2021-03-26 ENCOUNTER — BULK ORDERING (OUTPATIENT)
Dept: CASE MANAGEMENT | Facility: OTHER | Age: 56
End: 2021-03-26

## 2021-03-26 DIAGNOSIS — Z23 IMMUNIZATION DUE: ICD-10-CM

## 2021-03-26 PROCEDURE — 0001A: CPT | Performed by: INTERNAL MEDICINE

## 2021-03-26 PROCEDURE — 91300 HC SARSCOV02 VAC 30MCG/0.3ML IM: CPT | Performed by: INTERNAL MEDICINE

## 2021-04-01 ENCOUNTER — APPOINTMENT (OUTPATIENT)
Dept: WOMENS IMAGING | Facility: HOSPITAL | Age: 56
End: 2021-04-01

## 2021-04-01 PROCEDURE — 19083 BX BREAST 1ST LESION US IMAG: CPT | Performed by: RADIOLOGY

## 2021-04-01 PROCEDURE — 19081 BX BREAST 1ST LESION STRTCTC: CPT | Performed by: RADIOLOGY

## 2021-04-16 ENCOUNTER — IMMUNIZATION (OUTPATIENT)
Dept: VACCINE CLINIC | Facility: HOSPITAL | Age: 56
End: 2021-04-16

## 2021-04-16 PROCEDURE — 0002A: CPT | Performed by: INTERNAL MEDICINE

## 2021-04-16 PROCEDURE — 91300 HC SARSCOV02 VAC 30MCG/0.3ML IM: CPT | Performed by: INTERNAL MEDICINE

## 2021-04-30 ENCOUNTER — APPOINTMENT (OUTPATIENT)
Dept: SLEEP MEDICINE | Facility: HOSPITAL | Age: 56
End: 2021-04-30

## 2021-05-14 RX ORDER — ERGOCALCIFEROL 1.25 MG/1
CAPSULE ORAL
Qty: 12 CAPSULE | Refills: 2 | OUTPATIENT
Start: 2021-05-14

## 2021-05-21 RX ORDER — ERGOCALCIFEROL 1.25 MG/1
CAPSULE ORAL
Qty: 12 CAPSULE | Refills: 2 | OUTPATIENT
Start: 2021-05-21

## 2021-09-24 DIAGNOSIS — E55.9 AVITAMINOSIS D: Primary | ICD-10-CM

## 2021-09-24 RX ORDER — ERGOCALCIFEROL 1.25 MG/1
CAPSULE ORAL
Qty: 12 CAPSULE | Refills: 1 | Status: SHIPPED | OUTPATIENT
Start: 2021-09-24 | End: 2021-12-21

## 2021-11-10 ENCOUNTER — APPOINTMENT (OUTPATIENT)
Dept: WOMENS IMAGING | Facility: HOSPITAL | Age: 56
End: 2021-11-10

## 2021-11-10 PROCEDURE — 77065 DX MAMMO INCL CAD UNI: CPT | Performed by: RADIOLOGY

## 2021-11-10 PROCEDURE — 77061 BREAST TOMOSYNTHESIS UNI: CPT | Performed by: RADIOLOGY

## 2021-11-10 PROCEDURE — 76641 ULTRASOUND BREAST COMPLETE: CPT | Performed by: RADIOLOGY

## 2021-11-10 PROCEDURE — G0279 TOMOSYNTHESIS, MAMMO: HCPCS | Performed by: RADIOLOGY

## 2021-12-20 DIAGNOSIS — E55.9 AVITAMINOSIS D: ICD-10-CM

## 2021-12-21 RX ORDER — ERGOCALCIFEROL 1.25 MG/1
CAPSULE ORAL
Qty: 12 CAPSULE | Refills: 1 | Status: SHIPPED | OUTPATIENT
Start: 2021-12-21 | End: 2022-03-04

## 2021-12-27 ENCOUNTER — OFFICE VISIT (OUTPATIENT)
Dept: ENDOCRINOLOGY | Age: 56
End: 2021-12-27

## 2021-12-27 VITALS
RESPIRATION RATE: 20 BRPM | SYSTOLIC BLOOD PRESSURE: 145 MMHG | DIASTOLIC BLOOD PRESSURE: 89 MMHG | HEIGHT: 68 IN | OXYGEN SATURATION: 98 % | WEIGHT: 209 LBS | HEART RATE: 73 BPM | BODY MASS INDEX: 31.67 KG/M2

## 2021-12-27 DIAGNOSIS — E55.9 AVITAMINOSIS D: ICD-10-CM

## 2021-12-27 DIAGNOSIS — E04.1 SOLITARY THYROID NODULE: ICD-10-CM

## 2021-12-27 DIAGNOSIS — R73.03 PREDIABETES: ICD-10-CM

## 2021-12-27 DIAGNOSIS — E03.9 ADULT HYPOTHYROIDISM: Primary | ICD-10-CM

## 2021-12-27 DIAGNOSIS — E78.2 MIXED HYPERLIPIDEMIA: ICD-10-CM

## 2021-12-27 PROCEDURE — 99214 OFFICE O/P EST MOD 30 MIN: CPT | Performed by: NURSE PRACTITIONER

## 2021-12-27 NOTE — PROGRESS NOTES
"Chief Complaint  Chief Complaint   Patient presents with   • Hypothyroidism       Subjective          History of Present Illness    Theresa Palacio 56 y.o. presents for a follow-up evaluation of Hypothyroidism due to Hashimotos      She complains of constipation - intermittent and stable - and hair thinning.    Denies Fatigue, hair loss, dry skin, diarrhea, difficulty breathing,chest pain, palpitations, heat intolerance, cold intolerance, trouble swallowing, or change in voice.    Hysterectomy in 2007      Current treatment is levothyroxine 125 mcg daily    Last labs in 03/20 showed TSH 2.410, FT4 1.27 and FT3 3.2    Last US in 2018 showed Stable 1 cm left lobe thyroid nodule with stable scattered  heterogeneity throughout the thyroid.  Other thyroid US were done in 2016 and 2017.        Pre-Diabetes    Current treatment includes nothing    Father has diabetes    Did not tolerate Metformin due to GI side effects - Diarrhea    Last A1C in 03/20 was 5.90        Hyperlipidemia     Pt denies any muscle/body aches, chest pain, or shortness of breath    Pt is currently taking nothing    Crestor was stopped a couple of months ago.  Patient states that her LFTs went up and she stopped it.  Liver US in 2016 and 2019 showed fatty liver.    Mother has HLD    Last lipid panel in 03/20 showed Total 202, Triglycerides 105, HDL 43.2 and           Vitamin D Deficiency    Current treatment includes 50,000 units three times a week    Last Vit D level was 29.2 in 03.20        I have reviewed the patient's allergies, medicines, past medical hx, family hx and social hx.    Objective   Vital Signs:   /89   Pulse 73   Resp 20   Ht 172.7 cm (68\")   Wt 94.8 kg (209 lb)   SpO2 98%   BMI 31.78 kg/m²       Physical Exam   Physical Exam  Constitutional:       General: She is not in acute distress.     Appearance: Normal appearance. She is not ill-appearing.   HENT:      Head: Normocephalic and atraumatic.   Eyes:      " General:         Right eye: No discharge.         Left eye: No discharge.   Neck:      Thyroid: No thyroid mass, thyromegaly or thyroid tenderness.      Trachea: Trachea normal.   Cardiovascular:      Rate and Rhythm: Normal rate and regular rhythm.      Heart sounds: Normal heart sounds. No murmur heard.  No friction rub. No gallop.    Pulmonary:      Effort: Pulmonary effort is normal. No respiratory distress.      Breath sounds: Normal breath sounds. No wheezing.   Musculoskeletal:      Cervical back: Neck supple.   Skin:     General: Skin is warm and dry.   Neurological:      Mental Status: She is alert.   Psychiatric:         Mood and Affect: Mood normal.         Behavior: Behavior normal.                    Results Review:   TSH   Date Value Ref Range Status   03/06/2020 2.410 0.270 - 4.200 uIU/mL Final     T3, Free   Date Value Ref Range Status   03/06/2020 3.2 2.0 - 4.4 pg/mL Final         Assessment and Plan    Diagnoses and all orders for this visit:    1. Adult hypothyroidism (Primary)  -     Comprehensive Metabolic Panel  -     TSH  -     T4, Free  -     T3, Free    Check labs today  Continue with levothyroxine 125 mcg daily and will reassess after lab results      2. Avitaminosis D  -     Vitamin D 25 Hydroxy    Check labs today  Continue with current treatment      3. Mixed hyperlipidemia  -     Comprehensive Metabolic Panel  -     Lipid Panel    Off statin  Advised that statin therapy can make LFTs elevated, as well as a fatty liver.  Check labs today and then will reassess      4. Prediabetes  -     Hemoglobin A1c    Check labs today  If needed, consider 500 mg XR metformin.    5. Solitary thyroid nodule    US from 2019 showed stable nodule size  Pt doesn't want US at this time.  Denies voice changes or trouble swallowing.          No refills needed at this time      RTC in 6 months with Dr. Cardoza      Follow Up     Patient was given instructions and counseling regarding her condition or for health  maintenance advice. Please see specific information pulled into the AVS if appropriate.              Shelbie Chun, APRN  12/27/21

## 2021-12-28 LAB
25(OH)D3+25(OH)D2 SERPL-MCNC: 48.4 NG/ML (ref 30–100)
ALBUMIN SERPL-MCNC: 4.5 G/DL (ref 3.8–4.9)
ALBUMIN/GLOB SERPL: 1.6 {RATIO} (ref 1.2–2.2)
ALP SERPL-CCNC: 61 IU/L (ref 44–121)
ALT SERPL-CCNC: 62 IU/L (ref 0–32)
AST SERPL-CCNC: 43 IU/L (ref 0–40)
BILIRUB SERPL-MCNC: 0.3 MG/DL (ref 0–1.2)
BUN SERPL-MCNC: 12 MG/DL (ref 6–24)
BUN/CREAT SERPL: 17 (ref 9–23)
CALCIUM SERPL-MCNC: 9.5 MG/DL (ref 8.7–10.2)
CHLORIDE SERPL-SCNC: 102 MMOL/L (ref 96–106)
CHOLEST SERPL-MCNC: 245 MG/DL (ref 100–199)
CO2 SERPL-SCNC: 24 MMOL/L (ref 20–29)
CREAT SERPL-MCNC: 0.7 MG/DL (ref 0.57–1)
GLOBULIN SER CALC-MCNC: 2.8 G/DL (ref 1.5–4.5)
GLUCOSE SERPL-MCNC: 100 MG/DL (ref 65–99)
HBA1C MFR BLD: 6.2 % (ref 4.8–5.6)
HDLC SERPL-MCNC: 50 MG/DL
IMP & REVIEW OF LAB RESULTS: NORMAL
LDLC SERPL CALC-MCNC: 168 MG/DL (ref 0–99)
POTASSIUM SERPL-SCNC: 4.4 MMOL/L (ref 3.5–5.2)
PROT SERPL-MCNC: 7.3 G/DL (ref 6–8.5)
SODIUM SERPL-SCNC: 141 MMOL/L (ref 134–144)
T3FREE SERPL-MCNC: 2.5 PG/ML (ref 2–4.4)
T4 FREE SERPL-MCNC: 1.38 NG/DL (ref 0.82–1.77)
TRIGL SERPL-MCNC: 147 MG/DL (ref 0–149)
TSH SERPL DL<=0.005 MIU/L-ACNC: 1.96 UIU/ML (ref 0.45–4.5)
VLDLC SERPL CALC-MCNC: 27 MG/DL (ref 5–40)

## 2021-12-28 NOTE — PROGRESS NOTES
Called and talked to patient-    Thyroid levels are all normal- continue with current dose    Vitamin D levels are within normal range.    A1C is 6.2, which is still in pre-diabetic range.  She agrees to work on diet, exercise and weight loss.  Will hold off on metformin at this time and recheck at next visit.    ALT and AST are elevated, but stable    Total cholesterol and LDL are elevated.  Pt states that she went back on Crestor 20 mg HS.  Recheck LFTs in 2 months.

## 2022-02-11 DIAGNOSIS — E55.9 AVITAMINOSIS D: ICD-10-CM

## 2022-02-11 DIAGNOSIS — E04.1 SOLITARY THYROID NODULE: ICD-10-CM

## 2022-02-11 DIAGNOSIS — E03.9 ADULT HYPOTHYROIDISM: Primary | ICD-10-CM

## 2022-02-11 DIAGNOSIS — R53.82 CHRONIC FATIGUE: ICD-10-CM

## 2022-02-11 DIAGNOSIS — R73.03 PREDIABETES: ICD-10-CM

## 2022-02-11 DIAGNOSIS — E78.2 MIXED HYPERLIPIDEMIA: ICD-10-CM

## 2022-02-25 ENCOUNTER — TELEPHONE (OUTPATIENT)
Dept: ENDOCRINOLOGY | Age: 57
End: 2022-02-25

## 2022-02-25 DIAGNOSIS — E55.9 AVITAMINOSIS D: ICD-10-CM

## 2022-02-25 RX ORDER — LEVOTHYROXINE SODIUM 0.12 MG/1
125 TABLET ORAL DAILY
Qty: 90 TABLET | Refills: 1 | Status: SHIPPED | OUTPATIENT
Start: 2022-02-25 | End: 2022-10-18

## 2022-02-28 ENCOUNTER — LAB (OUTPATIENT)
Dept: ENDOCRINOLOGY | Age: 57
End: 2022-02-28

## 2022-02-28 DIAGNOSIS — E55.9 AVITAMINOSIS D: ICD-10-CM

## 2022-02-28 DIAGNOSIS — E78.2 MIXED HYPERLIPIDEMIA: ICD-10-CM

## 2022-02-28 DIAGNOSIS — E04.1 SOLITARY THYROID NODULE: ICD-10-CM

## 2022-02-28 DIAGNOSIS — R53.82 CHRONIC FATIGUE: ICD-10-CM

## 2022-02-28 DIAGNOSIS — E03.9 ADULT HYPOTHYROIDISM: ICD-10-CM

## 2022-02-28 DIAGNOSIS — R73.03 PREDIABETES: ICD-10-CM

## 2022-03-01 LAB
25(OH)D3+25(OH)D2 SERPL-MCNC: 59.1 NG/ML (ref 30–100)
ALBUMIN SERPL-MCNC: 4.6 G/DL (ref 3.8–4.9)
ALBUMIN/GLOB SERPL: 1.8 {RATIO} (ref 1.2–2.2)
ALP SERPL-CCNC: 64 IU/L (ref 44–121)
ALT SERPL-CCNC: 95 IU/L (ref 0–32)
AST SERPL-CCNC: 61 IU/L (ref 0–40)
BILIRUB SERPL-MCNC: 0.4 MG/DL (ref 0–1.2)
BUN SERPL-MCNC: 11 MG/DL (ref 6–24)
BUN/CREAT SERPL: 15 (ref 9–23)
CALCIUM SERPL-MCNC: 9.6 MG/DL (ref 8.7–10.2)
CHLORIDE SERPL-SCNC: 105 MMOL/L (ref 96–106)
CHOLEST SERPL-MCNC: 271 MG/DL (ref 100–199)
CO2 SERPL-SCNC: 23 MMOL/L (ref 20–29)
CREAT SERPL-MCNC: 0.73 MG/DL (ref 0.57–1)
EGFR GENE MUT ANL BLD/T: 95 ML/MIN/1.73
FRUCTOSAMINE SERPL-SCNC: 222 UMOL/L (ref 0–285)
GLOBULIN SER CALC-MCNC: 2.6 G/DL (ref 1.5–4.5)
GLUCOSE SERPL-MCNC: 100 MG/DL (ref 65–99)
HDLC SERPL-MCNC: 53 MG/DL
IMP & REVIEW OF LAB RESULTS: NORMAL
LDLC SERPL CALC-MCNC: 187 MG/DL (ref 0–99)
POTASSIUM SERPL-SCNC: 4.7 MMOL/L (ref 3.5–5.2)
PROT SERPL-MCNC: 7.2 G/DL (ref 6–8.5)
REPORT: NORMAL
SODIUM SERPL-SCNC: 144 MMOL/L (ref 134–144)
T3FREE SERPL-MCNC: 2.8 PG/ML (ref 2–4.4)
T4 FREE SERPL-MCNC: 1.08 NG/DL (ref 0.82–1.77)
TRIGL SERPL-MCNC: 168 MG/DL (ref 0–149)
TSH SERPL DL<=0.005 MIU/L-ACNC: 4.02 UIU/ML (ref 0.45–4.5)
VLDLC SERPL CALC-MCNC: 31 MG/DL (ref 5–40)

## 2022-03-02 DIAGNOSIS — E55.9 AVITAMINOSIS D: ICD-10-CM

## 2022-03-04 RX ORDER — ERGOCALCIFEROL 1.25 MG/1
CAPSULE ORAL
Qty: 12 CAPSULE | Refills: 1 | Status: SHIPPED | OUTPATIENT
Start: 2022-03-04 | End: 2022-06-02

## 2022-03-06 DIAGNOSIS — R74.01 ELEVATED TRANSAMINASE LEVEL: Primary | ICD-10-CM

## 2022-03-06 NOTE — PROGRESS NOTES
Normal fructosamine.    ALT and AST are higher.  Patient has been off Crestor.  Referral to Dr. Ty for elevated transaminase.  LDL higher at 187.  HDL 53.  Triglycerides 168.  Normal thyroid function tests.  Normal vitamin D.  Send copy of labs to Dr. Garza.  Please notify patient of results and instructions.

## 2022-04-20 ENCOUNTER — OFFICE VISIT (OUTPATIENT)
Dept: GASTROENTEROLOGY | Facility: CLINIC | Age: 57
End: 2022-04-20

## 2022-04-20 VITALS
TEMPERATURE: 97.1 F | WEIGHT: 212 LBS | HEIGHT: 68 IN | DIASTOLIC BLOOD PRESSURE: 78 MMHG | SYSTOLIC BLOOD PRESSURE: 138 MMHG | BODY MASS INDEX: 32.13 KG/M2

## 2022-04-20 DIAGNOSIS — K59.09 CHRONIC CONSTIPATION: Chronic | ICD-10-CM

## 2022-04-20 DIAGNOSIS — R74.8 ELEVATED LIVER ENZYMES: Primary | Chronic | ICD-10-CM

## 2022-04-20 DIAGNOSIS — K76.0 FATTY LIVER: Chronic | ICD-10-CM

## 2022-04-20 PROCEDURE — 99204 OFFICE O/P NEW MOD 45 MIN: CPT | Performed by: INTERNAL MEDICINE

## 2022-04-20 RX ORDER — UBIDECARENONE 75 MG
50 CAPSULE ORAL DAILY
COMMUNITY

## 2022-04-20 NOTE — PROGRESS NOTES
I have never seen this patient before.  She was taken off Crestor in February 2019 because her transaminases were elevated.  Patient was recently seen by Dr. Ty for evaluation and agrees on restarting on statin.  Suggest starting on Lipitor 10 mg/day and repeating CMP and lipid profile in 6 4 to 6 weeks..

## 2022-04-20 NOTE — PROGRESS NOTES
Chief Complaint   Patient presents with   • Abnormal Lab       Subjective     HPI    Theresa Palacio is a 56 y.o. female with a past medical history noted below who presents for elevated liver enzymes.  This has been present since 2015.  She has had imaging that has demonstrated fatty liver.  She has not had a full serologic work-up.  No family history of liver diseases.  She has never had an alcohol history.  No history of prior hepatitis infection.  No excess Tylenol or liver toxic supplements.  No right upper quadrant pain.    She does have a history of Hashimoto's thyroiditis.  She is maintained on Synthroid.  No recent dosing changes.    Takes magnesium, psyllium OTC, turmeric    Usually stays on the constipated side, better with probiotic, activia.    Paternal great aunt with CRC.    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      Today's visit was in the office.  Both the patient and I were wearing face masks and proper hand hygiene was performed before and after the physical exam.           Current Outpatient Medications:   •  levothyroxine (Synthroid) 125 MCG tablet, Take 1 tablet by mouth Daily., Disp: 90 tablet, Rfl: 1  •  vitamin B-12 (CYANOCOBALAMIN) 100 MCG tablet, Take 50 mcg by mouth Daily., Disp: , Rfl:   •  vitamin D (ERGOCALCIFEROL) 1.25 MG (69520 UT) capsule capsule, TAKE ONE CAPSULE BY MOUTH 3 TIMES WEEKLY, Disp: 12 capsule, Rfl: 1      Objective     Vitals:    04/20/22 1402   BP: 138/78   Temp: 97.1 °F (36.2 °C)         04/20/22  1402   Weight: 96.2 kg (212 lb)     Body mass index is 32.23 kg/m².    Physical Exam  Constitutional:       General: She is not in acute distress.  Pulmonary:      Effort: Pulmonary effort is normal.   Neurological:      Mental Status: She is alert and oriented to person, place, and time.   Psychiatric:         Mood and Affect: Mood normal.         Behavior:  Behavior normal.         Thought Content: Thought content normal.         Judgment: Judgment normal.             WBC   Date Value Ref Range Status   05/20/2016 4.80 4.50 - 10.70 10*3/mm3 Final     RBC   Date Value Ref Range Status   05/20/2016 4.77 3.90 - 5.20 10*6/mm3 Final     Hemoglobin   Date Value Ref Range Status   05/20/2016 14.7 11.9 - 15.5 g/dL Final     Hematocrit   Date Value Ref Range Status   05/20/2016 44.5 35.6 - 45.5 % Final     MCV   Date Value Ref Range Status   05/20/2016 93.3 80.5 - 98.2 fL Final     MCH   Date Value Ref Range Status   05/20/2016 30.8 26.9 - 32.7 pg Final     MCHC   Date Value Ref Range Status   05/20/2016 33.0 32.4 - 36.3 g/dL Final     RDW   Date Value Ref Range Status   05/20/2016 13.3 (H) 11.7 - 13.0 % Final     Platelets   Date Value Ref Range Status   05/20/2016 268 140 - 500 10*3/mm3 Final     Neutrophil Rel %   Date Value Ref Range Status   05/20/2016 44.2 42.7 - 76.0 % Final     Lymphocyte Rel %   Date Value Ref Range Status   05/20/2016 47.7 (H) 19.6 - 45.3 % Final     Monocyte Rel %   Date Value Ref Range Status   05/20/2016 6.0 5.0 - 12.0 % Final     Eosinophil Rel %   Date Value Ref Range Status   05/20/2016 1.7 0.3 - 6.2 % Final     Basophil Rel %   Date Value Ref Range Status   05/20/2016 0.4 0.0 - 1.5 % Final     Neutrophils Absolute   Date Value Ref Range Status   05/20/2016 2.12 1.90 - 8.10 10*3/mm3 Final     Lymphocytes Absolute   Date Value Ref Range Status   05/20/2016 2.29 0.90 - 4.80 10*3/mm3 Final     Monocytes Absolute   Date Value Ref Range Status   05/20/2016 0.29 0.20 - 1.20 10*3/mm3 Final     Eosinophils Absolute   Date Value Ref Range Status   05/20/2016 0.08 0.00 - 0.70 10*3/mm3 Final     Basophils Absolute   Date Value Ref Range Status   05/20/2016 0.02 0.00 - 0.20 10*3/mm3 Final       Lab Results   Component Value Date    GLUCOSE 100 (H) 02/28/2022    BUN 11 02/28/2022    CREATININE 0.73 02/28/2022    EGFRIFNONA 96 12/27/2021    EGFRIFAFRI 110  12/27/2021    BCR 15 02/28/2022    CO2 23 02/28/2022    CALCIUM 9.6 02/28/2022    PROTENTOTREF 7.2 02/28/2022    ALBUMIN 4.6 02/28/2022    LABIL2 1.8 02/28/2022    AST 61 (H) 02/28/2022    ALT 95 (H) 02/28/2022         LIVER ULTRASOUND 02/22/2019     HISTORY: Elevated liver enzymes.     The gallbladder is well-distended. At least 2 or 3 echogenic gallstones  are seen which do not clearly produce posterior shadowing. No  gallbladder wall thickening or pericholecystic fluid is seen.     The common bile duct is not dilated measuring 4.1 mm.     The liver appears somewhat echogenic consistent with fatty infiltration.  No focal hepatic lesions are seen. The pancreas and right kidney appear  normal. Right kidney measures 11.9 cm in length.     IMPRESSION:  1.  Cholelithiasis.  2.  Echogenic liver consistent with fatty infiltration.     This report was finalized on 2/25/2019 2:18 PM by Dr. Abhijeet Barbosa M.D.    I personally reviewed data as detailed below:     The labs listed above.    The radiology studies listed above.    Office notes from: 12/27/21 endocrinology    Endoscopy procedures and pathology from: 7/2021 colonoscopy with Strothman           Assessment/Plan    1.  Elevated liver enzymes: Chronic issue.  Suspected fatty liver disease but given her autoimmune thyroiditis, cannot rule out an autoimmune etiology    2.  Fatty liver: As per imaging    3.  Chronic constipation    Plan  Liver serologic work-up today  Encouraged use of MiraLAX  Further recommendations after her serologies  May need to repeat liver imaging  Discussed that may eventually need liver biopsy  I am okay with her getting back on a statin with careful monitoring of her transaminases    Diagnoses and all orders for this visit:    1. Elevated liver enzymes (Primary)  -     IgA  -     Tissue Transglutaminase, IgA  -     Tissue Transglutaminase, IgG  -     Alpha - 1 - Antitrypsin Phenotype  -     JESU  -     Anti-Smooth Muscle Antibody Titer  -      Ceruloplasmin  -     Ferritin  -     Gamma GT  -     Hepatitis Panel, Acute  -     IgG  -     Mitochondrial Antibodies, M2  -     Iron Profile    2. Fatty liver  -     IgA  -     Tissue Transglutaminase, IgA  -     Tissue Transglutaminase, IgG  -     Alpha - 1 - Antitrypsin Phenotype  -     JESU  -     Anti-Smooth Muscle Antibody Titer  -     Ceruloplasmin  -     Ferritin  -     Gamma GT  -     Hepatitis Panel, Acute  -     IgG  -     Mitochondrial Antibodies, M2  -     Iron Profile    3. Chronic constipation        I have discussed the above plan with the patient.  They verbalize understanding and are in agreement with the plan.  They have been advised to contact the office for any questions, concerns, or changes related to their health.    Dictated utilizing Dragon dictation

## 2022-04-21 LAB
ANA SER QL: NEGATIVE
CERULOPLASMIN SERPL-MCNC: 21.2 MG/DL (ref 19–39)
FERRITIN SERPL-MCNC: 302 NG/ML (ref 15–150)
GGT SERPL-CCNC: 36 IU/L (ref 0–60)
HAV IGM SERPL QL IA: NEGATIVE
HBV CORE IGM SERPL QL IA: NEGATIVE
HBV SURFACE AG SERPL QL IA: NEGATIVE
HCV AB S/CO SERPL IA: <0.1 S/CO RATIO (ref 0–0.9)
HCV AB SERPL QL IA: NORMAL
IGA SERPL-MCNC: 295 MG/DL (ref 87–352)
IGG SERPL-MCNC: 977 MG/DL (ref 586–1602)
IRON SATN MFR SERPL: 40 % (ref 15–55)
IRON SERPL-MCNC: 110 UG/DL (ref 27–159)
MITOCHONDRIA M2 IGG SER-ACNC: <20 UNITS (ref 0–20)
SMA IGG SER-ACNC: 8 UNITS (ref 0–19)
TIBC SERPL-MCNC: 273 UG/DL (ref 250–450)
TTG IGA SER-ACNC: <2 U/ML (ref 0–3)
TTG IGG SER-ACNC: 4 U/ML (ref 0–5)
UIBC SERPL-MCNC: 163 UG/DL (ref 131–425)

## 2022-04-26 ENCOUNTER — TELEPHONE (OUTPATIENT)
Dept: GASTROENTEROLOGY | Facility: CLINIC | Age: 57
End: 2022-04-26

## 2022-04-26 NOTE — TELEPHONE ENCOUNTER
----- Message from Theresa Palacio sent at 4/26/2022  8:43 AM EDT -----  Regarding: High level in one area after blood test   I noticed my Ferritin level was high from my recent blood work. Do I need to do anything?     Thank you,  Theresa Palacio

## 2022-04-26 NOTE — TELEPHONE ENCOUNTER
The ferritin is an inflammatory marker and can be seen in fatty liver disease.  I suspect this is the underlying etiology.    Still waiting on 1 lab test to return, hopefully back soon

## 2022-04-28 LAB
A1AT PHENOTYP SERPL IFE: NORMAL
A1AT SERPL-MCNC: 117 MG/DL (ref 101–187)

## 2022-04-28 NOTE — PROGRESS NOTES
All of her liver testing is back and not consistent with an autoimmune or genetic liver disease.  The ferritin can be seen with fatty liver disease, it is a marker of inflammation.  I would like for her to start vitamin E supplementation, 400 IU daily.

## 2022-04-29 ENCOUNTER — TELEPHONE (OUTPATIENT)
Dept: GASTROENTEROLOGY | Facility: CLINIC | Age: 57
End: 2022-04-29

## 2022-04-29 NOTE — TELEPHONE ENCOUNTER
----- Message from Nu Ty MD sent at 4/28/2022  7:20 PM EDT -----  All of her liver testing is back and not consistent with an autoimmune or genetic liver disease.  The ferritin can be seen with fatty liver disease, it is a marker of inflammation.  I would like for her to start vitamin E supplementation, 400 IU daily.

## 2022-05-02 ENCOUNTER — TRANSCRIBE ORDERS (OUTPATIENT)
Dept: ADMINISTRATIVE | Facility: HOSPITAL | Age: 57
End: 2022-05-02

## 2022-05-02 ENCOUNTER — LAB (OUTPATIENT)
Dept: LAB | Facility: HOSPITAL | Age: 57
End: 2022-05-02

## 2022-05-02 DIAGNOSIS — D68.59 THROMBOPHILIA: Primary | ICD-10-CM

## 2022-05-02 DIAGNOSIS — D68.59 THROMBOPHILIA: ICD-10-CM

## 2022-05-02 PROCEDURE — 85307 ASSAY ACTIVATED PROTEIN C: CPT

## 2022-05-02 PROCEDURE — 36415 COLL VENOUS BLD VENIPUNCTURE: CPT

## 2022-05-04 LAB — APCR PPP: 1.8 RATIO (ref 2.2–3.5)

## 2022-05-25 ENCOUNTER — LAB (OUTPATIENT)
Dept: LAB | Facility: HOSPITAL | Age: 57
End: 2022-05-25

## 2022-05-25 ENCOUNTER — TRANSCRIBE ORDERS (OUTPATIENT)
Dept: LAB | Facility: HOSPITAL | Age: 57
End: 2022-05-25

## 2022-05-25 DIAGNOSIS — R89.9 ABNORMAL LABORATORY TEST: Primary | ICD-10-CM

## 2022-05-25 DIAGNOSIS — R89.9 ABNORMAL LABORATORY TEST: ICD-10-CM

## 2022-05-25 PROCEDURE — 36415 COLL VENOUS BLD VENIPUNCTURE: CPT

## 2022-05-25 PROCEDURE — 85220 BLOOC CLOT FACTOR V TEST: CPT

## 2022-05-27 LAB — FACT V ACT/NOR PPP: 133 % (ref 70–150)

## 2022-06-01 DIAGNOSIS — E55.9 AVITAMINOSIS D: ICD-10-CM

## 2022-06-02 RX ORDER — ERGOCALCIFEROL 1.25 MG/1
CAPSULE ORAL
Qty: 12 CAPSULE | Refills: 1 | Status: SHIPPED | OUTPATIENT
Start: 2022-06-02 | End: 2022-07-11 | Stop reason: ALTCHOICE

## 2022-06-02 NOTE — TELEPHONE ENCOUNTER
Rx Refill Note  Requested Prescriptions     Pending Prescriptions Disp Refills    vitamin D (ERGOCALCIFEROL) 1.25 MG (10693 UT) capsule capsule [Pharmacy Med Name: VIT D2 (ERGOCAL) 1.25MG(50,000U) CP] 12 capsule 1     Sig: TAKE ONE CAPSULE BY MOUTH 3 TIMES A WEEK      Last office visit with prescribing clinician: 5 mo ago      Next office visit with prescribing clinician: 1 mo with Nani Dsouza  06/02/22, 14:12 EDT

## 2022-07-01 ENCOUNTER — TELEPHONE (OUTPATIENT)
Dept: ENDOCRINOLOGY | Age: 57
End: 2022-07-01

## 2022-07-01 DIAGNOSIS — E04.1 SOLITARY THYROID NODULE: ICD-10-CM

## 2022-07-01 DIAGNOSIS — E03.9 ADULT HYPOTHYROIDISM: ICD-10-CM

## 2022-07-01 DIAGNOSIS — R53.82 CHRONIC FATIGUE: ICD-10-CM

## 2022-07-01 DIAGNOSIS — E55.9 AVITAMINOSIS D: Primary | ICD-10-CM

## 2022-07-01 DIAGNOSIS — E78.2 MIXED HYPERLIPIDEMIA: ICD-10-CM

## 2022-07-01 DIAGNOSIS — E04.9 GOITER, NON-TOXIC: ICD-10-CM

## 2022-07-01 DIAGNOSIS — R73.03 PREDIABETES: ICD-10-CM

## 2022-07-05 ENCOUNTER — LAB (OUTPATIENT)
Dept: ENDOCRINOLOGY | Age: 57
End: 2022-07-05

## 2022-07-05 DIAGNOSIS — R73.03 PREDIABETES: ICD-10-CM

## 2022-07-05 DIAGNOSIS — E03.9 ADULT HYPOTHYROIDISM: ICD-10-CM

## 2022-07-05 DIAGNOSIS — E04.1 SOLITARY THYROID NODULE: ICD-10-CM

## 2022-07-05 DIAGNOSIS — E04.9 GOITER, NON-TOXIC: ICD-10-CM

## 2022-07-05 DIAGNOSIS — E78.2 MIXED HYPERLIPIDEMIA: ICD-10-CM

## 2022-07-05 DIAGNOSIS — R53.82 CHRONIC FATIGUE: ICD-10-CM

## 2022-07-05 DIAGNOSIS — E55.9 AVITAMINOSIS D: ICD-10-CM

## 2022-07-06 LAB
25(OH)D3+25(OH)D2 SERPL-MCNC: 65.5 NG/ML (ref 30–100)
ALBUMIN SERPL-MCNC: 4.8 G/DL (ref 3.8–4.9)
ALBUMIN/GLOB SERPL: 1.8 {RATIO} (ref 1.2–2.2)
ALP SERPL-CCNC: 63 IU/L (ref 44–121)
ALT SERPL-CCNC: 64 IU/L (ref 0–32)
AST SERPL-CCNC: 40 IU/L (ref 0–40)
BILIRUB SERPL-MCNC: 0.5 MG/DL (ref 0–1.2)
BUN SERPL-MCNC: 15 MG/DL (ref 6–24)
BUN/CREAT SERPL: 18 (ref 9–23)
CALCIUM SERPL-MCNC: 9.8 MG/DL (ref 8.7–10.2)
CHLORIDE SERPL-SCNC: 102 MMOL/L (ref 96–106)
CHOLEST SERPL-MCNC: 169 MG/DL (ref 100–199)
CO2 SERPL-SCNC: 25 MMOL/L (ref 20–29)
CREAT SERPL-MCNC: 0.83 MG/DL (ref 0.57–1)
EGFRCR SERPLBLD CKD-EPI 2021: 81 ML/MIN/1.73
GLOBULIN SER CALC-MCNC: 2.6 G/DL (ref 1.5–4.5)
GLUCOSE SERPL-MCNC: 104 MG/DL (ref 65–99)
HBA1C MFR BLD: 6.3 % (ref 4.8–5.6)
HDLC SERPL-MCNC: 70 MG/DL
IMP & REVIEW OF LAB RESULTS: NORMAL
LDLC SERPL CALC-MCNC: 83 MG/DL (ref 0–99)
POTASSIUM SERPL-SCNC: 4.7 MMOL/L (ref 3.5–5.2)
PROT SERPL-MCNC: 7.4 G/DL (ref 6–8.5)
SODIUM SERPL-SCNC: 141 MMOL/L (ref 134–144)
T3FREE SERPL-MCNC: 3 PG/ML (ref 2–4.4)
T4 FREE SERPL-MCNC: 1.65 NG/DL (ref 0.82–1.77)
TRIGL SERPL-MCNC: 90 MG/DL (ref 0–149)
TSH SERPL DL<=0.005 MIU/L-ACNC: 2.37 UIU/ML (ref 0.45–4.5)
VLDLC SERPL CALC-MCNC: 16 MG/DL (ref 5–40)

## 2022-07-08 NOTE — PROGRESS NOTES
Subjective   Theresa Palacio is a 57 y.o. female.     F/u for hypothyroidism , vitamin d def, hyperlipidemia, prediabetes, solitary thyroid nodule        Patient is a 57-year-old female came in for follow-up.  She is new to me.    She has hypothyroidism and is on levothyroxine 125 mcg/day.  Thyroid ultrasound done in 2018 showed a stable 1 cm left thyroid nodule.  She has no history of surgery or radiation therapy to the head or neck area.  She denies voice changes.  She has no significant weight change since 2021.  TSH done in 2022 is normal at 2.37 with a normal free T4 at 1.65 ng per DL.    She has hyperlipidemia and is on atorvastatin 10 mg/day.  She denies myalgia.  Abdominal ultrasound done in  showed fatty liver.  She denies history of hepatitis.  Lipid panel done in 2022 are as follows: Cholesterol 169.  HDL 70.  LDL 83.  Triglycerides 90    She has vitamin D deficiency and is on ergocalciferol 50,000 units 3 times a week for more than a year.  She denies chronic diarrhea.  25 hydroxy vitamin D done in 2022 is normal at 65.5 ng/mL.    She has prediabetes since .  She was on metformin 850 mg twice a day which was discontinued because of diarrhea and lightheadedness.  Hemoglobinlobin A1c done in 2022 is 6.3%.    She is  3 para 3.  Her last delivery was in .  She has no history of gestational diabetes mellitus or hypertension.  She had a total hysterectomy with bilateral salpingo-oophorectomy for endometriosis in .  She is not on estrogen replacement therapy.    Activated protein C resistance ratio is low at 1.8 in May 2022.  She has no history of deep vein thrombosis or arterial thrombosis.    She has sleep apnea and uses a CPAP regularly.      The following portions of the patient's history were reviewed and updated as appropriate: allergies, current medications, past family history, past medical history, past social history, past surgical history  "and problem list.    Review of Systems   Eyes: Negative for visual disturbance.   Respiratory: Negative for shortness of breath and wheezing.    Cardiovascular: Negative for chest pain and palpitations.   Gastrointestinal: Positive for constipation.   Endocrine: Negative for cold intolerance and polydipsia.   Genitourinary: Negative.    Neurological: Negative for numbness.     Vitals:    07/11/22 1109   BP: 128/70   Pulse: 75   Temp: 97.8 °F (36.6 °C)   SpO2: 98%   Weight: 94.4 kg (208 lb 3.2 oz)   Height: 172.7 cm (67.99\")      Objective   Physical Exam  Constitutional:       General: She is not in acute distress.     Appearance: Normal appearance. She is obese. She is not ill-appearing, toxic-appearing or diaphoretic.   HENT:      Mouth/Throat:      Pharynx: No oropharyngeal exudate or posterior oropharyngeal erythema.   Eyes:      General: No scleral icterus.        Right eye: No discharge.         Left eye: No discharge.      Extraocular Movements: Extraocular movements intact.   Cardiovascular:      Rate and Rhythm: Normal rate and regular rhythm.      Pulses: Normal pulses.      Heart sounds: Normal heart sounds.   Pulmonary:      Breath sounds: Normal breath sounds. No rales.   Chest:      Chest wall: No tenderness.   Abdominal:      General: Bowel sounds are normal. There is no distension.      Palpations: Abdomen is soft. There is no mass.      Tenderness: There is no right CVA tenderness or left CVA tenderness.   Musculoskeletal:         General: Normal range of motion.      Cervical back: Normal range of motion.   Skin:     General: Skin is warm and dry.   Neurological:      General: No focal deficit present.      Mental Status: She is alert and oriented to person, place, and time.   Psychiatric:         Mood and Affect: Mood normal.         Behavior: Behavior normal.       Lab on 07/05/2022   Component Date Value Ref Range Status   • TSH 07/05/2022 2.370  0.450 - 4.500 uIU/mL Final   • 25 Hydroxy, " Vitamin D 07/05/2022 65.5  30.0 - 100.0 ng/mL Final    Comment: Vitamin D deficiency has been defined by the [place] of  [employer]icine and an Endocrine Society practice guideline as a  level of serum 25-OH vitamin D less than 20 ng/mL (1,2).  The Endocrine Society went on to further define vitamin D  insufficiency as a level between 21 and 29 ng/mL (2).  1. IOM ([place] of [employer]). 2010. Dietary reference     intakes for calcium and D. [place]: The     National Academies Press.  2. Shilo MF, Petra SORTO, Ilia JANG, et al.     Evaluation, treatment, and prevention of vitamin D     deficiency: an Endocrine Society clinical practice     guideline. JCEM. 2011 Jul; 96(7):1911-30.     • Free T4 07/05/2022 1.65  0.82 - 1.77 ng/dL Final   • Total Cholesterol 07/05/2022 169  100 - 199 mg/dL Final   • Triglycerides 07/05/2022 90  0 - 149 mg/dL Final   • HDL Cholesterol 07/05/2022 70  >39 mg/dL Final   • VLDL Cholesterol [place] 07/05/2022 16  5 - 40 mg/dL Final   • LDL Chol Calc (NIH) 07/05/2022 83  0 - 99 mg/dL Final   • Glucose 07/05/2022 104 (A) 65 - 99 mg/dL Final   • BUN 07/05/2022 15  6 - 24 mg/dL Final   • Creatinine 07/05/2022 0.83  0.57 - 1.00 mg/dL Final   • EGFR Result 07/05/2022 81  >59 mL/min/1.73 Final   • BUN/Creatinine Ratio 07/05/2022 18  9 - 23 Final   • Sodium 07/05/2022 141  134 - 144 mmol/L Final   • Potassium 07/05/2022 4.7  3.5 - 5.2 mmol/L Final   • Chloride 07/05/2022 102  96 - 106 mmol/L Final   • Total CO2 07/05/2022 25  20 - 29 mmol/L Final   • Calcium 07/05/2022 9.8  8.7 - 10.2 mg/dL Final   • Total Protein 07/05/2022 7.4  6.0 - 8.5 g/dL Final   • Albumin 07/05/2022 4.8  3.8 - 4.9 g/dL Final   • Globulin 07/05/2022 2.6  1.5 - 4.5 g/dL Final   • A/G Ratio 07/05/2022 1.8  1.2 - 2.2 Final   • Total Bilirubin 07/05/2022 0.5  0.0 - 1.2 mg/dL Final   • Alkaline Phosphatase 07/05/2022 63  44 - 121 IU/L Final   • AST (SGOT) 07/05/2022 40  0 - 40 IU/L Final   • ALT (SGPT) 07/05/2022 64 (A) 0 -  32 IU/L Final   • Hemoglobin A1C 07/05/2022 6.3 (A) 4.8 - 5.6 % Final    Comment:          Prediabetes: 5.7 - 6.4           Diabetes: >6.4           Glycemic control for adults with diabetes: <7.0     • T3, Free 07/05/2022 3.0  2.0 - 4.4 pg/mL Final   • Interpretation 07/05/2022 Note   Final    Supplemental report is available.     Assessment & Plan   Diagnoses and all orders for this visit:    1. Adult hypothyroidism (Primary)    2. Mixed hyperlipidemia    3. Vitamin D deficiency    4. Nonalcoholic fatty liver disease    5. Prediabetes    6. Activated protein C resistance (HCC)  -     Ambulatory Referral to Hematology / Oncology    Other orders  -     Cholecalciferol (Vitamin D3) 50 MCG (2000 UT) capsule; Take 1 capsule by mouth Daily. Discontinue ergocalciferol  Dispense: 90 capsule; Refill: 3  -     metFORMIN ER (GLUCOPHAGE-XR) 500 MG 24 hr tablet; Take 1 tablet by mouth Daily With Dinner.  Dispense: 90 tablet; Refill: 1      Continue levothyroxine 125 mcg daily.  Continue atorvastatin 10 mg/day and low-fat diet.  Finish up current supply of ergocalciferol 50,000 units 3 times a week and then switch to vitamin D3 2000 units/day.  Patient has prediabetes.  Will start on metformin  mg once a day.  Will obtain hematology consultation for further evaluation of low activated protein C resistance ratio.    Copy my note sent to Dr. Garza and Dr. Opal GIVENSC 4 mos.

## 2022-07-11 ENCOUNTER — OFFICE VISIT (OUTPATIENT)
Dept: ENDOCRINOLOGY | Age: 57
End: 2022-07-11

## 2022-07-11 VITALS
BODY MASS INDEX: 31.55 KG/M2 | TEMPERATURE: 97.8 F | DIASTOLIC BLOOD PRESSURE: 70 MMHG | SYSTOLIC BLOOD PRESSURE: 128 MMHG | WEIGHT: 208.2 LBS | HEART RATE: 75 BPM | OXYGEN SATURATION: 98 % | HEIGHT: 68 IN

## 2022-07-11 DIAGNOSIS — E78.2 MIXED HYPERLIPIDEMIA: ICD-10-CM

## 2022-07-11 DIAGNOSIS — R73.03 PREDIABETES: ICD-10-CM

## 2022-07-11 DIAGNOSIS — E03.9 ADULT HYPOTHYROIDISM: Primary | ICD-10-CM

## 2022-07-11 DIAGNOSIS — D68.51 ACTIVATED PROTEIN C RESISTANCE: ICD-10-CM

## 2022-07-11 DIAGNOSIS — K76.0 NONALCOHOLIC FATTY LIVER DISEASE: ICD-10-CM

## 2022-07-11 DIAGNOSIS — E55.9 VITAMIN D DEFICIENCY: ICD-10-CM

## 2022-07-11 PROCEDURE — 99214 OFFICE O/P EST MOD 30 MIN: CPT | Performed by: INTERNAL MEDICINE

## 2022-07-11 RX ORDER — ROSUVASTATIN CALCIUM 40 MG/1
40 TABLET, COATED ORAL DAILY
COMMUNITY
Start: 2022-05-26 | End: 2023-02-13

## 2022-07-11 RX ORDER — VITAMIN E 268 MG
400 CAPSULE ORAL DAILY
COMMUNITY

## 2022-07-11 RX ORDER — MAGNESIUM CARB/ALUMINUM HYDROX 105-160MG
TABLET,CHEWABLE ORAL ONCE
COMMUNITY
End: 2022-07-11

## 2022-07-11 RX ORDER — FLUTICASONE PROPIONATE 50 MCG
SPRAY, SUSPENSION (ML) NASAL DAILY
COMMUNITY
Start: 2022-07-01 | End: 2022-07-27

## 2022-07-11 RX ORDER — METFORMIN HYDROCHLORIDE 500 MG/1
500 TABLET, EXTENDED RELEASE ORAL
Qty: 90 TABLET | Refills: 1 | Status: SHIPPED | OUTPATIENT
Start: 2022-07-11

## 2022-07-11 RX ORDER — ACETAMINOPHEN 160 MG
2000 TABLET,DISINTEGRATING ORAL DAILY
Qty: 90 CAPSULE | Refills: 3 | Status: SHIPPED | OUTPATIENT
Start: 2022-07-11 | End: 2023-07-11

## 2022-07-27 ENCOUNTER — LAB (OUTPATIENT)
Dept: OTHER | Facility: HOSPITAL | Age: 57
End: 2022-07-27

## 2022-07-27 ENCOUNTER — CONSULT (OUTPATIENT)
Dept: ONCOLOGY | Facility: CLINIC | Age: 57
End: 2022-07-27

## 2022-07-27 VITALS
WEIGHT: 209.1 LBS | HEART RATE: 68 BPM | HEIGHT: 68 IN | OXYGEN SATURATION: 97 % | RESPIRATION RATE: 16 BRPM | TEMPERATURE: 96.8 F | DIASTOLIC BLOOD PRESSURE: 77 MMHG | BODY MASS INDEX: 31.69 KG/M2 | SYSTOLIC BLOOD PRESSURE: 115 MMHG

## 2022-07-27 DIAGNOSIS — D68.51 ACTIVATED PROTEIN C RESISTANCE: Primary | ICD-10-CM

## 2022-07-27 DIAGNOSIS — E55.9 VITAMIN D DEFICIENCY: Primary | ICD-10-CM

## 2022-07-27 DIAGNOSIS — I82.812 SUPERFICIAL THROMBOSIS OF LEFT LOWER EXTREMITY: ICD-10-CM

## 2022-07-27 LAB
BASOPHILS # BLD AUTO: 0.03 10*3/MM3 (ref 0–0.2)
BASOPHILS NFR BLD AUTO: 0.5 % (ref 0–1.5)
DEPRECATED RDW RBC AUTO: 45.4 FL (ref 37–54)
EOSINOPHIL # BLD AUTO: 0.15 10*3/MM3 (ref 0–0.4)
EOSINOPHIL NFR BLD AUTO: 2.3 % (ref 0.3–6.2)
ERYTHROCYTE [DISTWIDTH] IN BLOOD BY AUTOMATED COUNT: 13.2 % (ref 12.3–15.4)
HCT VFR BLD AUTO: 42.9 % (ref 34–46.6)
HGB BLD-MCNC: 14.4 G/DL (ref 12–15.9)
IMM GRANULOCYTES # BLD AUTO: 0.02 10*3/MM3 (ref 0–0.05)
IMM GRANULOCYTES NFR BLD AUTO: 0.3 % (ref 0–0.5)
LYMPHOCYTES # BLD AUTO: 2.17 10*3/MM3 (ref 0.7–3.1)
LYMPHOCYTES NFR BLD AUTO: 33.1 % (ref 19.6–45.3)
MCH RBC QN AUTO: 31.4 PG (ref 26.6–33)
MCHC RBC AUTO-ENTMCNC: 33.6 G/DL (ref 31.5–35.7)
MCV RBC AUTO: 93.7 FL (ref 79–97)
MONOCYTES # BLD AUTO: 0.48 10*3/MM3 (ref 0.1–0.9)
MONOCYTES NFR BLD AUTO: 7.3 % (ref 5–12)
NEUTROPHILS NFR BLD AUTO: 3.71 10*3/MM3 (ref 1.7–7)
NEUTROPHILS NFR BLD AUTO: 56.5 % (ref 42.7–76)
NRBC BLD AUTO-RTO: 0 /100 WBC (ref 0–0.2)
PLATELET # BLD AUTO: 275 10*3/MM3 (ref 140–450)
PMV BLD AUTO: 10.9 FL (ref 6–12)
RBC # BLD AUTO: 4.58 10*6/MM3 (ref 3.77–5.28)
WBC NRBC COR # BLD: 6.56 10*3/MM3 (ref 3.4–10.8)

## 2022-07-27 PROCEDURE — 85025 COMPLETE CBC W/AUTO DIFF WBC: CPT | Performed by: INTERNAL MEDICINE

## 2022-07-27 PROCEDURE — 36415 COLL VENOUS BLD VENIPUNCTURE: CPT

## 2022-07-27 PROCEDURE — 99204 OFFICE O/P NEW MOD 45 MIN: CPT | Performed by: INTERNAL MEDICINE

## 2022-07-27 NOTE — PROGRESS NOTES
Subjective     REASON FOR CONSULTATION:  Provide an opinion on any further workup or treatment on:    Activated protein C resistance                       REQUESTING PHYSICIAN: Mika Cardoza MD    RECORDS OBTAINED: Records of the patients history including those obtained from the referring provider were reviewed and summarized in detail.    HISTORY OF PRESENT ILLNESS:    Theresa Palacio is a 57 y.o. patient who was referred for evaluation of activated protein C resistance.    Patient reports that she has history of recurrent miscarriages (total of 3).  She was seen by neurology due to numbness in the lower extremities around .  Due to the history of miscarriages and the neuropathy, she had blood work-up done and was told that she has a blood clotting disorder.  She did not see hematologist.  She was later on diagnosed with Hashimoto thyroiditis.    Patient had blood work-up in May 2022.  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  She was seen by vascular surgery for varicose veins in the left lower extremity.  Dr. Joseph recommended treatment of the varicose veins and she had that done about 2 weeks ago.  She was placed on Eliquis twice daily x3 days starting on the day of the procedure.     Patient reports developing pain, swelling and redness in the inner aspect of the left thigh and leg.  She noticed multiple hard areas in the distal aspect of the left thigh and proximal aspect of the leg.  She has been using the thigh-high compression stocking as instructed.    Patient does not have family history of deep vein thrombosis.  She took birth control pills for about 12-13 years in the past and did not have problem with thrombosis.  She had multiple surgeries in the past including , hysterectomy and appendectomy with no postoperative thrombosis.     REVIEW OF SYSTEMS:  Review of Systems   Constitutional: Negative for fatigue, fever and unexpected weight change.   HENT: Negative for  nosebleeds and voice change.    Eyes: Negative for visual disturbance.   Respiratory: Negative for cough and shortness of breath.    Cardiovascular: Negative for chest pain and leg swelling.   Gastrointestinal: Positive for constipation. Negative for abdominal pain, blood in stool, diarrhea, nausea and vomiting.   Endocrine: Positive for heat intolerance.   Genitourinary: Negative for frequency and hematuria.   Musculoskeletal: Positive for arthralgias. Negative for back pain and joint swelling.   Skin: Negative for rash.   Neurological: Negative for dizziness and headaches.   Hematological: Negative for adenopathy. Does not bruise/bleed easily.   Psychiatric/Behavioral: Negative for dysphoric mood. The patient is not nervous/anxious.      Past Medical History:   Diagnosis Date   • Fatigue    • Hyperlipidemia    • Hypothyroidism    • LFT elevation    • Prediabetes    • Sleep apnea    • Vitamin D deficiency      Past Surgical History:   Procedure Laterality Date   • APPENDECTOMY     •  SECTION     • COLONOSCOPY  2020    Normal except for diverticulosis   • HYSTERECTOMY      With bilateral salpingo-oophorectomy for endometriosis   • LYMPHADENECTOMY     • TONSILLECTOMY       Social History     Socioeconomic History   • Marital status:      Spouse name: Adrian   Tobacco Use   • Smoking status: Former Smoker     Years: 1.00   • Smokeless tobacco: Never Used   • Tobacco comment: 1 year back in high school    Vaping Use   • Vaping Use: Never used   Substance and Sexual Activity   • Alcohol use: Not Currently     Comment: Socially   • Sexual activity: Defer     Family History   Problem Relation Age of Onset   • Thyroid disease Mother    • Ulcerative colitis Father    • Diabetes Father    • Stroke Father    • Diverticulitis Father    • Stroke Maternal Grandmother    • Breast cancer Maternal Grandmother    • Stroke Maternal Grandfather       MEDICATIONS:    Current Outpatient Medications:   •  Cholecalciferol  "(Vitamin D3) 50 MCG (2000 UT) capsule, Take 1 capsule by mouth Daily. Discontinue ergocalciferol, Disp: 90 capsule, Rfl: 3  •  levothyroxine (Synthroid) 125 MCG tablet, Take 1 tablet by mouth Daily., Disp: 90 tablet, Rfl: 1  •  Magnesium 100 MG capsule, Take 100 mg by mouth Daily., Disp: , Rfl:   •  metFORMIN ER (GLUCOPHAGE-XR) 500 MG 24 hr tablet, Take 1 tablet by mouth Daily With Dinner., Disp: 90 tablet, Rfl: 1  •  rosuvastatin (CRESTOR) 40 MG tablet, Take 40 mg by mouth Daily., Disp: , Rfl:   •  vitamin B-12 (CYANOCOBALAMIN) 100 MCG tablet, Take 50 mcg by mouth Daily., Disp: , Rfl:   •  vitamin E 400 UNIT capsule, Take 400 Units by mouth Daily., Disp: , Rfl:      ALLERGIES:  Allergies   Allergen Reactions   • Metformin GI Intolerance   • Sulfa Antibiotics Rash      Objective   VITAL SIGNS:  Vitals:    07/27/22 1352   BP: 115/77   Pulse: 68   Resp: 16   Temp: 96.8 °F (36 °C)   TempSrc: Temporal   SpO2: 97%   Weight: 94.8 kg (209 lb 1.6 oz)   Height: 172 cm (67.72\")  Comment: NEW HT   PainSc: 0-No pain     Wt Readings from Last 3 Encounters:   07/27/22 94.8 kg (209 lb 1.6 oz)   07/11/22 94.4 kg (208 lb 3.2 oz)   04/20/22 96.2 kg (212 lb)       PHYSICAL EXAMINATION  GENERAL:  The patient appears in good general condition, not in acute distress.  SKIN: No skin rashes. No ecchymosis.  HEAD:  Normocephalic.  EYES:  No Jaundice. No Pallor. EOMI.  NECK:  Supple with Good ROM.   CHEST: Normal respiratory effort.   CARDIAC: Mild left leg edema.   EXTREMITIES: Exam of the left lower extremity revealed erythema, warmth and tenderness in the distal aspect of the left thigh, proximal and mid aspect of the left leg at the distribution of the greater saphenous vein.  There was a 1 cm area of skin in the mid aspect of the left leg with no surrounding redness.  NEUROLOGICAL:  No Focal neurological deficits.     RESULT REVIEW:   Results from last 7 days   Lab Units 07/27/22  1314   WBC 10*3/mm3 6.56   NEUTROS ABS 10*3/mm3 3.71 "   HEMOGLOBIN g/dL 14.4   HEMATOCRIT % 42.9   PLATELETS 10*3/mm3 275     Lab Results   Component Value Date    FERRITIN 302 (H) 04/20/2022    TIBC 273 04/20/2022      Component      Latest Ref Rng & Units 5/2/2022   Activated Protein C Resistance      2.2 - 3.5 ratio 1.8 (L)     Assessment & Plan   *Activated protein C resistance.  · Patient remembers that she was found to have an abnormality in the blood when she had testing by neurology around 2007 due to recurrent miscarriages.  · She was unable to obtain the old records but remembers being told that she had a blood clotting disorder.  · I explained to the patient that 90-95% of patients will have activated protein C resistance have factor V Leiden mutation.  I recommended obtaining the gene test to confirm the diagnosis.  · I explained the increased risk for thrombosis with hormone therapy and in the postoperative period.  She did not have problem with blood clotting following her previous surgeries.  She is not considering hormone replacement therapy at this point.    *Superficial thrombophlebitis of the left lower extremity.  · Exam today revealed superficial thrombophlebitis involving the greater saphenous vein above and below the knee.  · It developed following the recent procedure for varicose veins.  · She is currently symptomatic and having pain and leg swelling.  · Due to her underlying thrombophilia, recommended anticoagulation with Eliquis for 1 month.    PLAN:    1.  I will obtain factor V Leiden gene mutation test.  2.  I recommended starting treatment with Eliquis 5 mg twice daily for 1 month.  3.  Patient has follow-up with Dr. Joseph in 3 weeks.  I will defer to Dr. Joseph if the patient requires additional anticoagulation beyond 1 month.  4.  Recommended wearing the compression stocking for now and until she returns back from a trip to [place] in mid September 2022.  5.  I recommended taking Eliquis twice daily the day off and the day after she  travels to [place] in late August and when she returns back in mid-September.  6.  I did not schedule her for a follow-up visit.  We will be available to see her in the future if the need arises as in the case of perioperative clearance.        Rhina Chauhan MD  07/27/22

## 2022-09-15 ENCOUNTER — LAB (OUTPATIENT)
Dept: OTHER | Facility: HOSPITAL | Age: 57
End: 2022-09-15

## 2022-09-15 PROCEDURE — 36415 COLL VENOUS BLD VENIPUNCTURE: CPT | Performed by: INTERNAL MEDICINE

## 2022-09-15 PROCEDURE — 81241 F5 GENE: CPT | Performed by: INTERNAL MEDICINE

## 2022-09-16 ENCOUNTER — TELEPHONE (OUTPATIENT)
Dept: ONCOLOGY | Facility: CLINIC | Age: 57
End: 2022-09-16

## 2022-09-16 LAB — F5 GENE MUT ANL BLD/T: ABNORMAL

## 2022-09-16 NOTE — TELEPHONE ENCOUNTER
----- Message from Rhina Chauhan MD sent at 9/16/2022  7:59 AM EDT -----  Please inform the patient that the blood test confirmed the presence of factor V Leiden mutation.       Thank you

## 2022-10-18 RX ORDER — LEVOTHYROXINE SODIUM 0.12 MG/1
TABLET ORAL
Qty: 90 TABLET | Refills: 1 | Status: SHIPPED | OUTPATIENT
Start: 2022-10-18

## 2022-11-03 ENCOUNTER — APPOINTMENT (OUTPATIENT)
Dept: WOMENS IMAGING | Facility: HOSPITAL | Age: 57
End: 2022-11-03

## 2022-11-03 PROCEDURE — 77063 BREAST TOMOSYNTHESIS BI: CPT | Performed by: RADIOLOGY

## 2022-11-03 PROCEDURE — 77067 SCR MAMMO BI INCL CAD: CPT | Performed by: RADIOLOGY

## 2023-02-13 RX ORDER — ROSUVASTATIN CALCIUM 40 MG/1
TABLET, COATED ORAL
Qty: 90 TABLET | Refills: 1 | Status: SHIPPED | OUTPATIENT
Start: 2023-02-13

## 2023-04-11 ENCOUNTER — TELEPHONE (OUTPATIENT)
Dept: FAMILY MEDICINE CLINIC | Facility: CLINIC | Age: 58
End: 2023-04-11

## 2023-04-11 NOTE — TELEPHONE ENCOUNTER
Caller: Theresa Palacio    Relationship: Self    Best call back number: [phone]    What medication are you requesting: ATIVAN    What are your current symptoms: PATIENT IS FLYING ON 5/24    Have you had these symptoms before:    [x] Yes  [] No    Have you been treated for these symptoms before:   [x] Yes  [] No    If a prescription is needed, what is your preferred pharmacy and phone number: [facility] [id number] - [place], [place] - [address] [facility] [address] [facility] [phone] [facility] [phone] FX     Additional notes: PLEASE ADVISE WHEN SENT TO THE PHARMACY OR IF DR TORRES HAS ANY FURTHER QUESTIONS

## 2023-04-26 ENCOUNTER — OFFICE VISIT (OUTPATIENT)
Dept: FAMILY MEDICINE CLINIC | Facility: CLINIC | Age: 58
End: 2023-04-26
Payer: COMMERCIAL

## 2023-04-26 ENCOUNTER — TELEPHONE (OUTPATIENT)
Dept: ENDOCRINOLOGY | Age: 58
End: 2023-04-26
Payer: COMMERCIAL

## 2023-04-26 VITALS
TEMPERATURE: 97.8 F | OXYGEN SATURATION: 98 % | HEIGHT: 68 IN | RESPIRATION RATE: 16 BRPM | SYSTOLIC BLOOD PRESSURE: 117 MMHG | WEIGHT: 211.6 LBS | DIASTOLIC BLOOD PRESSURE: 80 MMHG | HEART RATE: 79 BPM | BODY MASS INDEX: 32.07 KG/M2

## 2023-04-26 DIAGNOSIS — F40.240 CLAUSTROPHOBIA: Primary | ICD-10-CM

## 2023-04-26 RX ORDER — LORAZEPAM 1 MG/1
1 TABLET ORAL 2 TIMES DAILY PRN
Qty: 6 TABLET | Refills: 0 | Status: SHIPPED | OUTPATIENT
Start: 2023-04-26

## 2023-04-26 NOTE — TELEPHONE ENCOUNTER
4/26 pt called in stating she has d/c her metformin due to bloating, stomach pain, cramping and nightmares. She is wanting to know if she can be switched to something else she is requesting  ozempic

## 2023-04-26 NOTE — PROGRESS NOTES
"Chief Complaint  Anxiety (Going out of the country and needs something for flying.)    Subjective    History of Present Illness {CC  Problem List  Visit  Diagnosis   Encounters  Notes  Medications  Labs  Result Review Imaging  Media :23}     Theresa Palacio presents to [facility] for Anxiety (Going out of the country and needs something for flying.).  History of Present Illness   She presents with history of claustrophobia and concerns of anxiety with flying.  She is scheduled to fly out of the country next month.  In the past she has used lorazepam as needed with flying and done very well with this.  She denies any anxiety not related to flying or claustrophobia.    Objective     Vital Signs:   /80   Pulse 79   Temp 97.8 °F (36.6 °C) (Oral)   Resp 16   Ht 172.7 cm (68\")   Wt 96 kg (211 lb 9.6 oz)   SpO2 98%   BMI 32.17 kg/m²   Body mass index is 32.17 kg/m².     Physical Exam  Constitutional:       General: She is not in acute distress.  Cardiovascular:      Rate and Rhythm: Normal rate and regular rhythm.      Heart sounds: No murmur heard.  Pulmonary:      Effort: No respiratory distress.      Breath sounds: Normal breath sounds.   Neurological:      General: No focal deficit present.      Mental Status: She is alert.   Psychiatric:         Mood and Affect: Mood normal.         Behavior: Behavior normal.          Result Review  Data Reviewed:{ Labs  Result Review  Imaging  Med Tab  Media :23}                Assessment and Plan {CC Problem List  Visit Diagnosis  ROS  Review (Popup)  Health Maintenance  Quality  BestPractice  Medications  SmartSets  SnapShot Encounters  Media :23}   Diagnoses and all orders for this visit:    1. Claustrophobia (Primary)  -     LORazepam (Ativan) 1 MG tablet; Take 1 tablet by mouth 2 (Two) Times a Day As Needed for Anxiety.  Dispense: 6 tablet; Refill: 0      Use lorazepam 1 twice a day as needed with " flying.  This is a controlled substance and should be used only when needed.  It has the risk of dependence and abuse.  When you finish the medication be sure to dispose of it properly.    Saurav was reviewed and was appropriate.  Patient was given instructions and counseling regarding her condition or for health maintenance advice on the AVS.       No follow-ups on file.    Ann Garza MD

## 2023-04-28 NOTE — TELEPHONE ENCOUNTER
Okay for hub to read to patient, please inform her that Patient was last seen in July 2022.  Please schedule follow-up appointment with NILES Chun NP.  Switch to Ozempic can be discussed at that time.

## 2023-05-02 ENCOUNTER — OFFICE VISIT (OUTPATIENT)
Dept: ENDOCRINOLOGY | Age: 58
End: 2023-05-02
Payer: COMMERCIAL

## 2023-05-02 VITALS
WEIGHT: 211.8 LBS | OXYGEN SATURATION: 97 % | BODY MASS INDEX: 32.1 KG/M2 | TEMPERATURE: 97.6 F | HEIGHT: 68 IN | SYSTOLIC BLOOD PRESSURE: 118 MMHG | DIASTOLIC BLOOD PRESSURE: 80 MMHG | HEART RATE: 61 BPM

## 2023-05-02 DIAGNOSIS — E03.9 ADULT HYPOTHYROIDISM: Primary | ICD-10-CM

## 2023-05-02 DIAGNOSIS — E55.9 VITAMIN D DEFICIENCY: ICD-10-CM

## 2023-05-02 DIAGNOSIS — E78.2 MIXED HYPERLIPIDEMIA: ICD-10-CM

## 2023-05-02 DIAGNOSIS — R73.03 PREDIABETES: ICD-10-CM

## 2023-05-02 LAB
25(OH)D3+25(OH)D2 SERPL-MCNC: 32.9 NG/ML (ref 30–100)
ALBUMIN SERPL-MCNC: 4.8 G/DL (ref 3.5–5.2)
ALBUMIN/GLOB SERPL: 2.1 G/DL
ALP SERPL-CCNC: 48 U/L (ref 39–117)
ALT SERPL-CCNC: 48 U/L (ref 1–33)
AST SERPL-CCNC: 30 U/L (ref 1–32)
BILIRUB SERPL-MCNC: 0.4 MG/DL (ref 0–1.2)
BUN SERPL-MCNC: 10 MG/DL (ref 6–20)
BUN/CREAT SERPL: 14.3 (ref 7–25)
CALCIUM SERPL-MCNC: 10.1 MG/DL (ref 8.6–10.5)
CHLORIDE SERPL-SCNC: 104 MMOL/L (ref 98–107)
CHOLEST SERPL-MCNC: 169 MG/DL (ref 0–200)
CO2 SERPL-SCNC: 29.3 MMOL/L (ref 22–29)
CREAT SERPL-MCNC: 0.7 MG/DL (ref 0.57–1)
EGFRCR SERPLBLD CKD-EPI 2021: 99.8 ML/MIN/1.73
GLOBULIN SER CALC-MCNC: 2.3 GM/DL
GLUCOSE SERPL-MCNC: 116 MG/DL (ref 65–99)
HBA1C MFR BLD: 6.5 % (ref 4.8–5.6)
HDLC SERPL-MCNC: 57 MG/DL (ref 40–60)
IMP & REVIEW OF LAB RESULTS: NORMAL
LDLC SERPL CALC-MCNC: 88 MG/DL (ref 0–100)
POTASSIUM SERPL-SCNC: 4.5 MMOL/L (ref 3.5–5.2)
PROT SERPL-MCNC: 7.1 G/DL (ref 6–8.5)
SODIUM SERPL-SCNC: 142 MMOL/L (ref 136–145)
TRIGL SERPL-MCNC: 138 MG/DL (ref 0–150)
TSH SERPL DL<=0.005 MIU/L-ACNC: 2.22 UIU/ML (ref 0.27–4.2)
VLDLC SERPL CALC-MCNC: 24 MG/DL (ref 5–40)

## 2023-05-02 NOTE — PROGRESS NOTES
Chief Complaint  Chief Complaint   Patient presents with   • Adult hypothyroidism     Pt states that her energy levels are just about the same she has family hx (mother) of thyroid disease her weight is stable but higher than she would like for it to be says that she has been struggling with her weight in the last 6 months and she no longer has menstrual cycles ( surgical)        Subjective          History of Present Illness    Theresa Palacio 57 y.o. presents for a follow-up evaluation of Hypothyroidism      She complains of fatigue and hair loss    Denies dry skin, diarrhea, constipation, shortness of breath,chest pain, palpitations, cold intolerance, trouble swallowing or change in voice.      Weight gain of 3 lbs since last visit.    She had a total hysterectomy with bilateral salpingo-oophorectomy for endometriosis in 2007.      Current treatment is levothyroxine 125 mcg daily    Last labs in 07/22 showed TSH 2.370 and FT4 1.65    Thyroid ultrasound done in June 2018 showed a stable 1 cm left thyroid nodule            Pre-Diabetes    She has prediabetes since 2020    Father has diabetes    Current treatment includes dietary modification   She was on metformin 850 mg twice a day which was discontinued because of diarrhea and lightheadedness  Allergy to sulfa      Last A1C in 07/22 was 6.3            Hyperlipidemia     Pt denies any muscle/body aches, chest pain or shortness of breath    Pt is currently taking Crestor 40 mg HS    Last lipid panel in 07/22 showed Total 169, HDL 70, LDL 83 and Triglycerides 90,            Vitamin D Deficiency    Current treatment includes 2,000 units daily    Last Vit D level was 65.5 in 07/22             Other History    Activated protein C resistance ratio is low at 1.8 in May 2022.  She has no history of deep vein thrombosis or arterial thrombosis.            I have reviewed the patient's allergies, medicines, past medical hx, family hx and social hx.    Objective   Vital  "Signs:   /80   Pulse 61   Temp 97.6 °F (36.4 °C) (Temporal)   Ht 172.7 cm (68\")   Wt 96.1 kg (211 lb 12.8 oz)   SpO2 97%   BMI 32.20 kg/m²       Physical Exam   Physical Exam  Constitutional:       General: She is not in acute distress.     Appearance: Normal appearance. She is not ill-appearing.   HENT:      Head: Normocephalic and atraumatic.   Eyes:      General:         Right eye: No discharge.         Left eye: No discharge.   Neck:      Thyroid: No thyroid mass, thyromegaly or thyroid tenderness.      Trachea: Trachea normal.   Cardiovascular:      Rate and Rhythm: Normal rate and regular rhythm.      Heart sounds: Normal heart sounds. No murmur heard.    No friction rub. No gallop.   Pulmonary:      Effort: Pulmonary effort is normal. No respiratory distress.      Breath sounds: Normal breath sounds. No wheezing.   Musculoskeletal:      Cervical back: Neck supple.   Skin:     General: Skin is warm and dry.   Neurological:      Mental Status: She is alert.   Psychiatric:         Mood and Affect: Mood normal.         Behavior: Behavior normal.                    Results Review:   TSH   Date Value Ref Range Status   07/05/2022 2.370 0.450 - 4.500 uIU/mL Final     T3, Free   Date Value Ref Range Status   07/05/2022 3.0 2.0 - 4.4 pg/mL Final         Assessment and Plan    Diagnoses and all orders for this visit:    1. Adult hypothyroidism (Primary)  -     TSH Rfx On Abnormal To Free T4    Check labs today  Continue with levothyroxine 125 mcg daily      2. Prediabetes  -     Hemoglobin A1c  -     Comprehensive Metabolic Panel    Check labs today  Continue with dietary modification  Unable to tolerate Metformin        3. Mixed hyperlipidemia  -     Comprehensive Metabolic Panel  -     Lipid Panel    Check labs today  Continue with statin        4. Vitamin D deficiency  -     Vitamin D,25-Hydroxy    Check labs today  Continue with supplement              Labs today  RTC in 6 months with " Yson      Follow Up     Patient was given instructions and counseling regarding her condition or for health maintenance advice. Please see specific information pulled into the AVS if appropriate.              CHELLE Holm  05/02/23

## 2023-05-03 DIAGNOSIS — E03.9 ADULT HYPOTHYROIDISM: Primary | ICD-10-CM

## 2023-05-03 RX ORDER — LEVOTHYROXINE SODIUM 0.12 MG/1
125 TABLET ORAL DAILY
Qty: 90 TABLET | Refills: 1 | Status: SHIPPED | OUTPATIENT
Start: 2023-05-03

## 2023-08-16 DIAGNOSIS — E55.9 VITAMIN D DEFICIENCY: Primary | ICD-10-CM

## 2023-08-16 RX ORDER — ACETAMINOPHEN 160 MG
2000 TABLET,DISINTEGRATING ORAL DAILY
Qty: 90 CAPSULE | Refills: 3 | Status: SHIPPED | OUTPATIENT
Start: 2023-08-16

## 2023-11-08 ENCOUNTER — OFFICE VISIT (OUTPATIENT)
Dept: ENDOCRINOLOGY | Age: 58
End: 2023-11-08
Payer: COMMERCIAL

## 2023-11-08 VITALS
DIASTOLIC BLOOD PRESSURE: 90 MMHG | TEMPERATURE: 94.8 F | WEIGHT: 209.4 LBS | HEIGHT: 68 IN | HEART RATE: 65 BPM | OXYGEN SATURATION: 99 % | BODY MASS INDEX: 31.74 KG/M2 | SYSTOLIC BLOOD PRESSURE: 122 MMHG

## 2023-11-08 DIAGNOSIS — E03.9 PRIMARY HYPOTHYROIDISM: Primary | ICD-10-CM

## 2023-11-08 DIAGNOSIS — R73.03 PREDIABETES: ICD-10-CM

## 2023-11-08 DIAGNOSIS — E55.9 VITAMIN D DEFICIENCY: ICD-10-CM

## 2023-11-08 DIAGNOSIS — E78.2 MIXED HYPERLIPIDEMIA: ICD-10-CM

## 2023-11-08 DIAGNOSIS — G47.33 OSA (OBSTRUCTIVE SLEEP APNEA): ICD-10-CM

## 2023-11-08 PROCEDURE — 99214 OFFICE O/P EST MOD 30 MIN: CPT | Performed by: INTERNAL MEDICINE

## 2023-11-08 NOTE — LETTER
2023     Ann Garza MD  [address] [facility] [address]    Patient: Theresa Palacio   YOB: 1965   Date of Visit: 2023     Dear Ann Garza MD:       Thank you for referring Theresa Palacio to me for evaluation. Below are the relevant portions of my assessment and plan of care.    If you have questions, please do not hesitate to call me. I look forward to following Theresa along with you.         Sincerely,        Mika Cardoza MD        CC: MD Nani Herron, Mika MCFARLAND MD  23 1055  Sign when Signing Visit  Subjective  Theresa Palacio is a 58 y.o. female.     History of Present Illness       Patient is a 58-year-old female came in for follow-up.       She has hypothyroidism and is on levothyroxine 125 mcg/day.  Thyroid ultrasound done in 2018 showed a stable 1 cm left thyroid nodule.  She has no history of surgery or radiation therapy to the head or neck area.  She denies voice changes.  She has lost 2 lbs since .       She has hyperlipidemia and is on Crestor 40 mg/day.  She denies myalgia.  Abdominal ultrasound done in  showed fatty liver.  She denies history of hepatitis.       She has vitamin D deficiency and is on vit D3 2000 units/day.  She denies chronic diarrhea.       She has prediabetes since .  She was on metformin 850 mg twice a day which was discontinued because of diarrhea and lightheadedness.  Her last meal was last night.     She is  3 para 3.  Her last delivery was in .  She has no history of gestational diabetes mellitus or hypertension.  She had a total hysterectomy with bilateral salpingo-oophorectomy for endometriosis in .  She is not on estrogen replacement therapy.  She has osteopenia on bone density done at her gynecologist.     She has Factor 5 Leiden mutation and was seen by Dr. Chauhan in the past.  Activated protein C resistance ratio is low at 1.8 in May 2022.  She  "has no history of deep vein thrombosis or arterial thrombosis.  She is on Eliquis when she travels.  She follows with      She has sleep apnea and is not using CPAP regularly.    She is scheduled for left knee replacement by Dr. Pablo in December 2023    The following portions of the patient's history were reviewed and updated as appropriate: allergies, current medications, past family history, past medical history, past social history, past surgical history, and problem list.    Review of Systems  Vitals:    11/08/23 0952   BP: 122/90   Pulse: 65   Temp: 94.8 °F (34.9 °C)   TempSrc: Temporal   SpO2: 99%   Weight: 95 kg (209 lb 6.4 oz)   Height: 172.7 cm (67.99\")      Objective  Physical Exam  Office Visit on 05/02/2023   Component Date Value Ref Range Status   • Hemoglobin A1C 05/02/2023 6.50 (H)  4.80 - 5.60 % Final    Comment: Hemoglobin A1C Ranges:  Increased Risk for Diabetes  5.7% to 6.4%  Diabetes                     >= 6.5%  Diabetic Goal                < 7.0%     • Glucose 05/02/2023 116 (H)  65 - 99 mg/dL Final   • BUN 05/02/2023 10  6 - 20 mg/dL Final   • Creatinine 05/02/2023 0.70  0.57 - 1.00 mg/dL Final   • EGFR Result 05/02/2023 99.8  >60.0 mL/min/1.73 Final    Comment: GFR Normal >60  Chronic Kidney Disease <60  Kidney Failure <15     • BUN/Creatinine Ratio 05/02/2023 14.3  7.0 - 25.0 Final   • Sodium 05/02/2023 142  136 - 145 mmol/L Final   • Potassium 05/02/2023 4.5  3.5 - 5.2 mmol/L Final   • Chloride 05/02/2023 104  98 - 107 mmol/L Final   • Total CO2 05/02/2023 29.3 (H)  22.0 - 29.0 mmol/L Final   • Calcium 05/02/2023 10.1  8.6 - 10.5 mg/dL Final   • Total Protein 05/02/2023 7.1  6.0 - 8.5 g/dL Final   • Albumin 05/02/2023 4.8  3.5 - 5.2 g/dL Final   • Globulin 05/02/2023 2.3  gm/dL Final   • A/G Ratio 05/02/2023 2.1  g/dL Final   • Total Bilirubin 05/02/2023 0.4  0.0 - 1.2 mg/dL Final   • Alkaline Phosphatase 05/02/2023 48  39 - 117 U/L Final   • AST (SGOT) 05/02/2023 30  1 - 32 U/L Final "   • ALT (SGPT) 05/02/2023 48 (H)  1 - 33 U/L Final   • Total Cholesterol 05/02/2023 169  0 - 200 mg/dL Final    Comment: Cholesterol Reference Ranges  (U.S. Department of Health and Human Services ATP III  Classifications)  Desirable          <200 mg/dL  Borderline High    200-239 mg/dL  High Risk          >240 mg/dL  Triglyceride Reference Ranges  (U.S. Department of Health and Human Services ATP III  Classifications)  Normal           <150 mg/dL  Borderline High  150-199 mg/dL  High             200-499 mg/dL  Very High        >500 mg/dL  HDL Reference Ranges  (U.S. Department of Health and Human Services ATP III  Classifications)  Low     <40 mg/dl (major risk factor for CHD)  High    >60 mg/dl ('negative' risk factor for CHD)  LDL Reference Ranges  (U.S. Department of Health and Human Services ATP III  Classifications)  Optimal          <100 mg/dL  Near Optimal     100-129 mg/dL  Borderline High  130-159 mg/dL  High             160-189 mg/dL  Very High        >189 mg/dL     • Triglycerides 05/02/2023 138  0 - 150 mg/dL Final   • HDL Cholesterol 05/02/2023 57  40 - 60 mg/dL Final   • VLDL Cholesterol [place] 05/02/2023 24  5 - 40 mg/dL Final   • LDL Chol Calc (NIH) 05/02/2023 88  0 - 100 mg/dL Final   • TSH 05/02/2023 2.220  0.270 - 4.200 uIU/mL Final   • 25 Hydroxy, Vitamin D 05/02/2023 32.9  30.0 - 100.0 ng/ml Final    Comment: Reference Range for Total Vitamin D 25(OH)  Deficiency <20.0 ng/mL  Insufficiency 21-29 ng/mL  Sufficiency  ng/mL  Toxicity >100 ng/ml     • Interpretation 05/02/2023 Note   Final    Supplemental report is available.     Assessment & Plan  Diagnoses and all orders for this visit:    1. Primary hypothyroidism (Primary)  -     TSH    2. Mixed hyperlipidemia  -     Comprehensive Metabolic Panel  -     Lipid Panel  -     CK  -     Aldolase    3. Vitamin D deficiency  -     Vitamin D,25-Hydroxy    4. Prediabetes  -     Comprehensive Metabolic Panel  -     Hemoglobin A1c    5. KAYLI  (obstructive sleep apnea)        Continue levothyroxine 125 mcg a day.  Continue Crestor 40 mg/day.  Continue vitamin D3 2000 units/day.  Continue no concentrated sweet low-fat diet.  Continue vitamin D3 2000 units/day.    Copy of my note sent to Dr. Garza and Dr. Pablo.    RTC 6 mos with NILES Chun NP

## 2023-11-08 NOTE — PROGRESS NOTES
Subjective   Theresa Palacio is a 58 y.o. female.     History of Present Illness       Patient is a 58-year-old female came in for follow-up.       She has hypothyroidism and is on levothyroxine 125 mcg/day.  Thyroid ultrasound done in 2018 showed a stable 1 cm left thyroid nodule.  She has no history of surgery or radiation therapy to the head or neck area.  She denies voice changes.  She has lost 2 lbs since .       She has hyperlipidemia and is on Crestor 40 mg/day.  She denies myalgia.  Abdominal ultrasound done in  showed fatty liver.  She denies history of hepatitis.       She has vitamin D deficiency and is on vit D3 2000 units/day.  She denies chronic diarrhea.       She has prediabetes since .  She was on metformin 850 mg twice a day which was discontinued because of diarrhea and lightheadedness.  Her last meal was last night.     She is  3 para 3.  Her last delivery was in .  She has no history of gestational diabetes mellitus or hypertension.  She had a total hysterectomy with bilateral salpingo-oophorectomy for endometriosis in .  She is not on estrogen replacement therapy.  She has osteopenia on bone density done at her gynecologist.     She has Factor 5 Leiden mutation and was seen by Dr. Chauhan in the past.  Activated protein C resistance ratio is low at 1.8 in May 2022.  She has no history of deep vein thrombosis or arterial thrombosis.  She is on Eliquis when she travels.  She follows with      She has sleep apnea and is not using CPAP regularly.    She is scheduled for left knee replacement by Dr. Pablo in 2023    The following portions of the patient's history were reviewed and updated as appropriate: allergies, current medications, past family history, past medical history, past social history, past surgical history, and problem list.    Review of Systems  Vitals:    23 0952   BP: 122/90   Pulse: 65   Temp: 94.8 °F (34.9 °C)   TempSrc:  "Temporal   SpO2: 99%   Weight: 95 kg (209 lb 6.4 oz)   Height: 172.7 cm (67.99\")      Objective   Physical Exam  Office Visit on 05/02/2023   Component Date Value Ref Range Status    Hemoglobin A1C 05/02/2023 6.50 (H)  4.80 - 5.60 % Final    Comment: Hemoglobin A1C Ranges:  Increased Risk for Diabetes  5.7% to 6.4%  Diabetes                     >= 6.5%  Diabetic Goal                < 7.0%      Glucose 05/02/2023 116 (H)  65 - 99 mg/dL Final    BUN 05/02/2023 10  6 - 20 mg/dL Final    Creatinine 05/02/2023 0.70  0.57 - 1.00 mg/dL Final    EGFR Result 05/02/2023 99.8  >60.0 mL/min/1.73 Final    Comment: GFR Normal >60  Chronic Kidney Disease <60  Kidney Failure <15      BUN/Creatinine Ratio 05/02/2023 14.3  7.0 - 25.0 Final    Sodium 05/02/2023 142  136 - 145 mmol/L Final    Potassium 05/02/2023 4.5  3.5 - 5.2 mmol/L Final    Chloride 05/02/2023 104  98 - 107 mmol/L Final    Total CO2 05/02/2023 29.3 (H)  22.0 - 29.0 mmol/L Final    Calcium 05/02/2023 10.1  8.6 - 10.5 mg/dL Final    Total Protein 05/02/2023 7.1  6.0 - 8.5 g/dL Final    Albumin 05/02/2023 4.8  3.5 - 5.2 g/dL Final    Globulin 05/02/2023 2.3  gm/dL Final    A/G Ratio 05/02/2023 2.1  g/dL Final    Total Bilirubin 05/02/2023 0.4  0.0 - 1.2 mg/dL Final    Alkaline Phosphatase 05/02/2023 48  39 - 117 U/L Final    AST (SGOT) 05/02/2023 30  1 - 32 U/L Final    ALT (SGPT) 05/02/2023 48 (H)  1 - 33 U/L Final    Total Cholesterol 05/02/2023 169  0 - 200 mg/dL Final    Comment: Cholesterol Reference Ranges  (U.S. Department of Health and Human Services ATP III  Classifications)  Desirable          <200 mg/dL  Borderline High    200-239 mg/dL  High Risk          >240 mg/dL  Triglyceride Reference Ranges  (U.S. Department of Health and Human Services ATP III  Classifications)  Normal           <150 mg/dL  Borderline High  150-199 mg/dL  High             200-499 mg/dL  Very High        >500 mg/dL  HDL Reference Ranges  (U.S. Department of Health and Human Services " ATP III  Classifications)  Low     <40 mg/dl (major risk factor for CHD)  High    >60 mg/dl ('negative' risk factor for CHD)  LDL Reference Ranges  (U.S. Department of Health and Human Services ATP III  Classifications)  Optimal          <100 mg/dL  Near Optimal     100-129 mg/dL  Borderline High  130-159 mg/dL  High             160-189 mg/dL  Very High        >189 mg/dL      Triglycerides 05/02/2023 138  0 - 150 mg/dL Final    HDL Cholesterol 05/02/2023 57  40 - 60 mg/dL Final    VLDL Cholesterol [place] 05/02/2023 24  5 - 40 mg/dL Final    LDL Chol Calc ([facility]) 05/02/2023 88  0 - 100 mg/dL Final    TSH 05/02/2023 2.220  0.270 - 4.200 uIU/mL Final    25 Hydroxy, Vitamin D 05/02/2023 32.9  30.0 - 100.0 ng/ml Final    Comment: Reference Range for Total Vitamin D 25(OH)  Deficiency <20.0 ng/mL  Insufficiency 21-29 ng/mL  Sufficiency  ng/mL  Toxicity >100 ng/ml      Interpretation 05/02/2023 Note   Final    Supplemental report is available.     Assessment & Plan   Diagnoses and all orders for this visit:    1. Primary hypothyroidism (Primary)  -     TSH    2. Mixed hyperlipidemia  -     Comprehensive Metabolic Panel  -     Lipid Panel  -     CK  -     Aldolase    3. Vitamin D deficiency  -     Vitamin D,25-Hydroxy    4. Prediabetes  -     Comprehensive Metabolic Panel  -     Hemoglobin A1c    5. KAYLI (obstructive sleep apnea)        Continue levothyroxine 125 mcg a day.  Continue Crestor 40 mg/day.  Continue vitamin D3 2000 units/day.  Continue no concentrated sweet low-fat diet.  Continue vitamin D3 2000 units/day.    Copy of my note sent to Dr. Garza and Dr. Pablo.    RTC 6 mos with NILES Chun NP

## 2023-11-09 LAB
25(OH)D3+25(OH)D2 SERPL-MCNC: 37.8 NG/ML (ref 30–100)
ALBUMIN SERPL-MCNC: 4.9 G/DL (ref 3.5–5.2)
ALBUMIN/GLOB SERPL: 2 G/DL
ALDOLASE SERPL-CCNC: 6 U/L (ref 3.3–10.3)
ALP SERPL-CCNC: 61 U/L (ref 39–117)
ALT SERPL-CCNC: 58 U/L (ref 1–33)
AST SERPL-CCNC: 34 U/L (ref 1–32)
BILIRUB SERPL-MCNC: 0.4 MG/DL (ref 0–1.2)
BUN SERPL-MCNC: 13 MG/DL (ref 8–23)
BUN/CREAT SERPL: 18.6 (ref 7–25)
CALCIUM SERPL-MCNC: 10.2 MG/DL (ref 8.6–10.5)
CHLORIDE SERPL-SCNC: 102 MMOL/L (ref 98–107)
CHOLEST SERPL-MCNC: 203 MG/DL (ref 0–200)
CK SERPL-CCNC: 48 U/L (ref 20–180)
CO2 SERPL-SCNC: 27.8 MMOL/L (ref 22–29)
CREAT SERPL-MCNC: 0.7 MG/DL (ref 0.57–1)
EGFRCR SERPLBLD CKD-EPI 2021: 99.2 ML/MIN/1.73
GLOBULIN SER CALC-MCNC: 2.4 GM/DL
GLUCOSE SERPL-MCNC: 110 MG/DL (ref 65–99)
HBA1C MFR BLD: 6.2 % (ref 4.8–5.6)
HDLC SERPL-MCNC: 59 MG/DL (ref 40–60)
IMP & REVIEW OF LAB RESULTS: NORMAL
LDLC SERPL CALC-MCNC: 119 MG/DL (ref 0–100)
POTASSIUM SERPL-SCNC: 4.7 MMOL/L (ref 3.5–5.2)
PROT SERPL-MCNC: 7.3 G/DL (ref 6–8.5)
SODIUM SERPL-SCNC: 140 MMOL/L (ref 136–145)
TRIGL SERPL-MCNC: 140 MG/DL (ref 0–150)
TSH SERPL DL<=0.005 MIU/L-ACNC: 1.61 UIU/ML (ref 0.27–4.2)
VLDLC SERPL CALC-MCNC: 25 MG/DL (ref 5–40)

## 2023-11-11 NOTE — PROGRESS NOTES
.  HDL 59.  Continue Crestor 40 mg/day.  Normal CPK and aldolase.  Hemoglobin A1c 6.2%.  Hemoglobin A1c and fasting glucose are elevated but not in diabetic range.  Normal thyroid function tests.  Continue levothyroxine 125 mcg a day.  Normal vitamin D.  Continue vitamin D3 2000 units/day.  Copy of labs sent to Dr. Garza.  Send copy of labs to Dr. Pablo.  Copy of labs sent to patient through [facility].

## 2023-11-18 DIAGNOSIS — E03.9 ADULT HYPOTHYROIDISM: ICD-10-CM

## 2023-11-20 RX ORDER — LEVOTHYROXINE SODIUM 0.12 MG/1
125 TABLET ORAL DAILY
Qty: 90 TABLET | Refills: 1 | Status: SHIPPED | OUTPATIENT
Start: 2023-11-20

## 2023-11-20 NOTE — TELEPHONE ENCOUNTER
Rx Refill Note  Requested Prescriptions     Pending Prescriptions Disp Refills    levothyroxine (SYNTHROID, LEVOTHROID) 125 MCG tablet [Pharmacy Med Name: LEVOTHYROXINE 125 MCG TABLET] 90 tablet 1     Sig: TAKE ONE TABLET BY MOUTH DAILY      Last office visit with prescribing clinician: 5/2/2023   Last telemedicine visit with prescribing clinician: Visit date not found   Next office visit with prescribing clinician: Visit date not found                         Would you like a call back once the refill request has been completed: [] Yes [] No    If the office needs to give you a call back, can they leave a voicemail: [] Yes [] No    Loni Damon  11/20/23, 08:18 EST

## 2024-01-08 ENCOUNTER — OFFICE VISIT (OUTPATIENT)
Dept: FAMILY MEDICINE CLINIC | Facility: CLINIC | Age: 59
End: 2024-01-08
Payer: COMMERCIAL

## 2024-01-08 VITALS
SYSTOLIC BLOOD PRESSURE: 123 MMHG | OXYGEN SATURATION: 96 % | WEIGHT: 203.2 LBS | HEIGHT: 68 IN | DIASTOLIC BLOOD PRESSURE: 87 MMHG | HEART RATE: 88 BPM | BODY MASS INDEX: 30.8 KG/M2

## 2024-01-08 DIAGNOSIS — J20.9 ACUTE BRONCHITIS, UNSPECIFIED ORGANISM: Primary | ICD-10-CM

## 2024-01-08 LAB
EXPIRATION DATE: NORMAL
INTERNAL CONTROL: NORMAL
Lab: NORMAL
SARS-COV-2 AG UPPER RESP QL IA.RAPID: NOT DETECTED

## 2024-01-08 PROCEDURE — 99213 OFFICE O/P EST LOW 20 MIN: CPT | Performed by: FAMILY MEDICINE

## 2024-01-08 PROCEDURE — 87426 SARSCOV CORONAVIRUS AG IA: CPT | Performed by: FAMILY MEDICINE

## 2024-01-08 RX ORDER — ALBUTEROL SULFATE 90 UG/1
2 AEROSOL, METERED RESPIRATORY (INHALATION) EVERY 4 HOURS PRN
Qty: 18 G | Refills: 0 | Status: SHIPPED | OUTPATIENT
Start: 2024-01-08

## 2024-01-08 RX ORDER — DEXTROMETHORPHAN HYDROBROMIDE AND PROMETHAZINE HYDROCHLORIDE 15; 6.25 MG/5ML; MG/5ML
5 SYRUP ORAL 4 TIMES DAILY PRN
Qty: 120 ML | Refills: 0 | Status: SHIPPED | OUTPATIENT
Start: 2024-01-08

## 2024-01-08 RX ORDER — ASPIRIN 81 MG/1
TABLET, COATED ORAL
COMMUNITY
Start: 2023-12-06

## 2024-01-08 RX ORDER — AZITHROMYCIN 250 MG/1
TABLET, FILM COATED ORAL
Qty: 6 TABLET | Refills: 0 | Status: SHIPPED | OUTPATIENT
Start: 2024-01-08

## 2024-01-08 NOTE — PROGRESS NOTES
"Chief Complaint  Sore Throat (Started a week ago), Nasal Congestion (More in chest / coughing up mucus ), Cough (Hard to sleep), and Fatigue    Subjective    History of Present Illness {CC  Problem List  Visit  Diagnosis   Encounters  Notes  Medications  Labs  Result Review Imaging  Media :23}     Theresa Palacio presents to [facility] for Sore Throat (Started a week ago), Nasal Congestion (More in chest / coughing up mucus ), Cough (Hard to sleep), and Fatigue.  History of Present Illness     She presents with 1 week history of sore throat, cough, hoarseness and fatigue.  She was seen in the urgent care last week and had strep test that was negative, She has been taking allegra-d and tylenol. She is most bothered by cough especially at night. She has no history of asthma and was never a smoker. She denies any fever or known ill contacts.     Objective     Vital Signs:   /87   Pulse 88   Ht 172.7 cm (68\")   Wt 92.2 kg (203 lb 3.2 oz)   SpO2 96%   BMI 30.90 kg/m²   Body mass index is 30.9 kg/m².     Physical Exam  Constitutional:       General: She is not in acute distress.  Cardiovascular:      Rate and Rhythm: Normal rate and regular rhythm.      Heart sounds: No murmur heard.  Pulmonary:      Effort: No respiratory distress.      Breath sounds: Normal breath sounds.   Neurological:      General: No focal deficit present.      Mental Status: She is alert.   Psychiatric:         Behavior: Behavior normal.          Result Review  Data Reviewed:{ Labs  Result Review  Imaging  Med Tab  Media :23}                Assessment and Plan {CC Problem List  Visit Diagnosis  ROS  Review (Popup)  Health Maintenance  Quality  BestPractice  Medications  SmartSets  SnapShot Encounters  Media :23}   Diagnoses and all orders for this visit:    1. Acute bronchitis, unspecified organism (Primary)  -     POCT SARS-CoV-2 Antigen JANI    Other orders  -     azithromycin " (Zithromax Z-Leroy) 250 MG tablet; Take 2 tablets the first day, then 1 tablet daily for 4 days.  Dispense: 6 tablet; Refill: 0  -     albuterol sulfate  (90 Base) MCG/ACT inhaler; Inhale 2 puffs Every 4 (Four) Hours As Needed (cough or wheezing).  Dispense: 18 g; Refill: 0  -     promethazine-dextromethorphan (PROMETHAZINE-DM) 6.25-15 MG/5ML syrup; Take 5 mL by mouth 4 (Four) Times a Day As Needed for Cough.  Dispense: 120 mL; Refill: 0        Patient Instructions   Take antibiotic as directed.  You can use the albuterol as need for cough and chest tightness.   The promethazine cough syrup can make you drowsy so I generally recommend it at night for the cough.       Patient was given instructions and counseling regarding her condition or for health maintenance advice on the AVS.       No follow-ups on file.    Ann Garza MD        Statement Selected

## 2024-01-08 NOTE — PATIENT INSTRUCTIONS
Take antibiotic as directed.  You can use the albuterol as need for cough and chest tightness.   The promethazine cough syrup can make you drowsy so I generally recommend it at night for the cough.

## 2024-05-15 ENCOUNTER — TELEPHONE (OUTPATIENT)
Dept: ENDOCRINOLOGY | Age: 59
End: 2024-05-15

## 2024-05-15 NOTE — TELEPHONE ENCOUNTER
Caller: Theresa Palacio    Relationship to patient: Self    Best call back number: [phone]     Patient is needing: PT NEEDS TO KNOW IF VISIT ON 5/16/24 REQUIRES LABS. PLEASE CALL TO CONFIRM.

## 2024-05-16 ENCOUNTER — OFFICE VISIT (OUTPATIENT)
Dept: ENDOCRINOLOGY | Age: 59
End: 2024-05-16
Payer: COMMERCIAL

## 2024-05-16 VITALS
BODY MASS INDEX: 29.77 KG/M2 | DIASTOLIC BLOOD PRESSURE: 88 MMHG | TEMPERATURE: 97.6 F | OXYGEN SATURATION: 99 % | SYSTOLIC BLOOD PRESSURE: 122 MMHG | HEART RATE: 82 BPM | WEIGHT: 196.4 LBS | HEIGHT: 68 IN

## 2024-05-16 DIAGNOSIS — E78.2 MIXED HYPERLIPIDEMIA: ICD-10-CM

## 2024-05-16 DIAGNOSIS — R73.03 PREDIABETES: ICD-10-CM

## 2024-05-16 DIAGNOSIS — E55.9 VITAMIN D DEFICIENCY: ICD-10-CM

## 2024-05-16 DIAGNOSIS — E04.1 SOLITARY THYROID NODULE: Primary | ICD-10-CM

## 2024-05-16 DIAGNOSIS — Z78.9 STATIN INTOLERANCE: ICD-10-CM

## 2024-05-16 PROCEDURE — 99214 OFFICE O/P EST MOD 30 MIN: CPT | Performed by: INTERNAL MEDICINE

## 2024-05-16 NOTE — PROGRESS NOTES
Subjective   Theresa Palacio is a 58 y.o. female.     History of Present Illness     She has hypothyroidism and is on levothyroxine 125 mcg/day.  Thyroid ultrasound done in 2018 showed a stable 1 cm left thyroid nodule.  She has no history of surgery or radiation therapy to the head or neck area.  She denies voice changes.  She has intentionally lost 13 lbs since .     She has hyperlipidemia and is on Crestor 40 mg/day.  She went off Crestor because muscle achiness.  Abdominal ultrasound done in  showed fatty liver.  She denies history of hepatitis.  She has not used Zetia before.  Her last meal was last night.     She has vitamin D deficiency and is on vit D3 2000 units/day.  She denies chronic diarrhea.       She has prediabetes since .  She had diarrhea with metformin  mg once a day in the past.  Her last meal was last night.     She is  3 para 3.  Her last delivery was in .  She has no history of gestational diabetes mellitus or hypertension.  She had a total hysterectomy with bilateral salpingo-oophorectomy for endometriosis in .  She is not on estrogen replacement therapy.  She has osteopenia on bone density done at her gynecologist.     She has Factor 5 Leiden mutation and was seen by Dr. Chauhan in the past.  Activated protein C resistance ratio is low at 1.8 in May 2022.  She has no history of deep vein thrombosis or arterial thrombosis.  She is on Eliquis when she travels.       She has sleep apnea and is not using CPAP regularly.  She has not seen Dr. BARTOLO Murdock recently.    She is retired  and  for bath and body products.    The following portions of the patient's history were reviewed and updated as appropriate: allergies, current medications, past family history, past medical history, past social history, past surgical history, and problem list.    Review of Systems   Eyes:  Negative for visual disturbance.   Respiratory:  Negative for  "shortness of breath.    Cardiovascular:  Negative for chest pain and palpitations.   Gastrointestinal: Negative.    Genitourinary: Negative.    Musculoskeletal:  Negative for myalgias.   Neurological:  Negative for numbness.     Vitals:    05/16/24 0955   BP: 122/88   Pulse: 82   Temp: 97.6 °F (36.4 °C)   TempSrc: Temporal   SpO2: 99%   Weight: 89.1 kg (196 lb 6.4 oz)   Height: 172.7 cm (67.99\")      Objective   Physical Exam  Constitutional:       Appearance: Normal appearance.   Eyes:      General: No scleral icterus.        Right eye: No discharge.         Left eye: No discharge.      Extraocular Movements: Extraocular movements intact.   Neck:      Vascular: No carotid bruit.   Cardiovascular:      Rate and Rhythm: Normal rate and regular rhythm.      Heart sounds: Normal heart sounds.   Pulmonary:      Breath sounds: Normal breath sounds. No rales.   Chest:      Chest wall: No tenderness.   Abdominal:      General: Bowel sounds are normal.      Palpations: Abdomen is soft.      Tenderness: There is no right CVA tenderness or left CVA tenderness.   Musculoskeletal:      Right lower leg: No edema.      Left lower leg: No edema.   Lymphadenopathy:      Cervical: No cervical adenopathy.   Neurological:      Mental Status: She is alert and oriented to person, place, and time.       Office Visit on 01/08/2024   Component Date Value Ref Range Status    SARS Antigen 01/08/2024 Not Detected  Not Detected, Presumptive Negative Final    Internal Control 01/08/2024 Passed  Passed Final    Lot Number 01/08/2024 [id number]   Final    Expiration Date 01/08/2024 06/11/24   Final     Assessment & Plan   Diagnoses and all orders for this visit:    1. Solitary thyroid nodule (Primary)  -     TSH Rfx On Abnormal To Free T4    2. Mixed hyperlipidemia  -     Lipid Panel    3. Statin intolerance  -     Lipid Panel    4. Vitamin D deficiency  -     Vitamin D,25-Hydroxy    5. Prediabetes  -     Comprehensive Metabolic Panel  -     " Hemoglobin A1c      Continue levothyroxine 125 mcg/day.    Check lipid panel.  Continue no concentrated sweet low-fat diet.  Increase exercise activity as tolerated.    Continue vitamin D3 2000 units/day.    Use CPAP regularly.  Follow-up with sleep specialist.    Copy of my note sent to Dr. Garza.    RTC 6 mos.

## 2024-05-17 LAB
25(OH)D3+25(OH)D2 SERPL-MCNC: 32.4 NG/ML (ref 30–100)
ALBUMIN SERPL-MCNC: 4.8 G/DL (ref 3.5–5.2)
ALBUMIN/GLOB SERPL: 1.8 G/DL
ALP SERPL-CCNC: 79 U/L (ref 39–117)
ALT SERPL-CCNC: 38 U/L (ref 1–33)
AST SERPL-CCNC: 27 U/L (ref 1–32)
BILIRUB SERPL-MCNC: 0.4 MG/DL (ref 0–1.2)
BUN SERPL-MCNC: 12 MG/DL (ref 8–23)
BUN/CREAT SERPL: 19.4 (ref 7–25)
CALCIUM SERPL-MCNC: 9.7 MG/DL (ref 8.6–10.5)
CHLORIDE SERPL-SCNC: 105 MMOL/L (ref 98–107)
CHOLEST SERPL-MCNC: 196 MG/DL (ref 0–200)
CO2 SERPL-SCNC: 27.1 MMOL/L (ref 22–29)
CREAT SERPL-MCNC: 0.62 MG/DL (ref 0.57–1)
EGFRCR SERPLBLD CKD-EPI 2021: 102.1 ML/MIN/1.73
GLOBULIN SER CALC-MCNC: 2.7 GM/DL
GLUCOSE SERPL-MCNC: 102 MG/DL (ref 65–99)
HBA1C MFR BLD: 6.1 % (ref 4.8–5.6)
HDLC SERPL-MCNC: 54 MG/DL (ref 40–60)
IMP & REVIEW OF LAB RESULTS: NORMAL
LDLC SERPL CALC-MCNC: 115 MG/DL (ref 0–100)
POTASSIUM SERPL-SCNC: 4.5 MMOL/L (ref 3.5–5.2)
PROT SERPL-MCNC: 7.5 G/DL (ref 6–8.5)
SODIUM SERPL-SCNC: 143 MMOL/L (ref 136–145)
TRIGL SERPL-MCNC: 153 MG/DL (ref 0–150)
TSH SERPL DL<=0.005 MIU/L-ACNC: 1.48 UIU/ML (ref 0.27–4.2)
VLDLC SERPL CALC-MCNC: 27 MG/DL (ref 5–40)

## 2024-05-18 NOTE — PROGRESS NOTES
Hemoglobin A1c 6.1%.  Hemoglobin A1c and fasting glucose are elevated but not in diabetic range.  .  HDL 54.  Continue Crestor 40 mg a day and low-fat diet.  Try to lose 5% of current weight.  Normal thyroid function test.  Continue levothyroxine 125 mcg/day.    Normal vitamin D.  Continue vitamin D3 2000 units/day.  Copy of labs sent to Dr. Garza and to patient through [facility].

## 2024-05-23 ENCOUNTER — OFFICE VISIT (OUTPATIENT)
Dept: FAMILY MEDICINE CLINIC | Facility: CLINIC | Age: 59
End: 2024-05-23
Payer: COMMERCIAL

## 2024-05-23 VITALS
SYSTOLIC BLOOD PRESSURE: 120 MMHG | RESPIRATION RATE: 17 BRPM | HEART RATE: 73 BPM | WEIGHT: 196.8 LBS | BODY MASS INDEX: 29.83 KG/M2 | HEIGHT: 68 IN | OXYGEN SATURATION: 97 % | DIASTOLIC BLOOD PRESSURE: 70 MMHG

## 2024-05-23 DIAGNOSIS — F40.243 ANXIETY WITH FLYING: Primary | ICD-10-CM

## 2024-05-23 DIAGNOSIS — F40.240 CLAUSTROPHOBIA: ICD-10-CM

## 2024-05-23 PROCEDURE — 99213 OFFICE O/P EST LOW 20 MIN: CPT | Performed by: FAMILY MEDICINE

## 2024-05-23 RX ORDER — LORAZEPAM 1 MG/1
1 TABLET ORAL 2 TIMES DAILY PRN
Qty: 6 TABLET | Refills: 0 | Status: SHIPPED | OUTPATIENT
Start: 2024-05-23

## 2024-05-23 NOTE — PATIENT INSTRUCTIONS
Take lorazepam as needed for anxiety with flying.  Lorazepam is a controlled medication. This medication has the risk of dependence and addiction. Use the medication only as directed. You will be required to be seen every 90 days and have routine drug screening and Saurav monitoring while on the medication. If you stop the medication, be sure to dispose of the medication properly.

## 2024-05-23 NOTE — PROGRESS NOTES
"Chief Complaint  Med Refill    Subjective    History of Present Illness {CC  Problem List  Visit  Diagnosis   Encounters  Notes  Medications  Labs  Result Review Imaging  Media :23}     Theresa Palacio presents to [facility] for Med Refill.  She presents with concerns of anxiety with flying. She reports increased claustrophobia on plane and it triggers increased anxiety. She has used lorazepam in the past and was helpful without side effects. She has upcoming flight in a couple of weeks and is requesting refill. It has been more than a year since she had a refill.     History of Present Illness     Objective     Vital Signs:   /70   Pulse 73   Resp 17   Ht 172.7 cm (67.99\")   Wt 89.3 kg (196 lb 12.8 oz)   SpO2 97%   BMI 29.93 kg/m²   Body mass index is 29.93 kg/m².     Physical Exam  Constitutional:       General: She is not in acute distress.  Cardiovascular:      Rate and Rhythm: Normal rate and regular rhythm.      Heart sounds: No murmur heard.  Pulmonary:      Effort: No respiratory distress.      Breath sounds: Normal breath sounds.   Neurological:      General: No focal deficit present.      Mental Status: She is alert.          Result Review  Data Reviewed:{ Labs  Result Review  Imaging  Med Tab  Media :23}                Assessment and Plan { Problem List  Visit Diagnosis  ROS  Review (Popup)  Health Maintenance  Quality  BestPractice  Medications  SmartSets  SnapShot Encounters  Media :23}   Diagnoses and all orders for this visit:    1. Anxiety with flying (Primary)    2. Claustrophobia  -     LORazepam (Ativan) 1 MG tablet; Take 1 tablet by mouth 2 (Two) Times a Day As Needed for Anxiety.  Dispense: 6 tablet; Refill: 0        Patient Instructions   Take lorazepam as needed for anxiety with flying.  Lorazepam is a controlled medication. This medication has the risk of dependence and addiction. Use the medication only as directed. " You will be required to be seen every 90 days and have routine drug screening and Saurav monitoring while on the medication. If you stop the medication, be sure to dispose of the medication properly.       Saurav reviewed and was appropriate.     Patient was given instructions and counseling regarding her condition or for health maintenance advice on the AVS.       Return in about 3 months (around 8/23/2024) for Annual.    Ann Garza MD

## 2024-05-29 DIAGNOSIS — E03.9 ADULT HYPOTHYROIDISM: ICD-10-CM

## 2024-05-29 RX ORDER — LEVOTHYROXINE SODIUM 0.12 MG/1
125 TABLET ORAL DAILY
Qty: 90 TABLET | Refills: 1 | Status: SHIPPED | OUTPATIENT
Start: 2024-05-29

## 2024-05-29 NOTE — TELEPHONE ENCOUNTER
Rx Refill Note  Requested Prescriptions     Pending Prescriptions Disp Refills    levothyroxine (SYNTHROID, LEVOTHROID) 125 MCG tablet [Pharmacy Med Name: LEVOTHYROXINE 125 MCG TABLET] 90 tablet 1     Sig: TAKE 1 TABLET BY MOUTH DAILY      Last office visit with prescribing clinician: 5/2/2023   Last telemedicine visit with prescribing clinician: Visit date not found   Next office visit with prescribing clinician: Visit date not found                         Would you like a call back once the refill request has been completed: [] Yes [] No    If the office needs to give you a call back, can they leave a voicemail: [] Yes [] No    Loni Damon  05/29/24, 07:40 EDT

## 2024-08-08 ENCOUNTER — OFFICE VISIT (OUTPATIENT)
Dept: FAMILY MEDICINE CLINIC | Facility: CLINIC | Age: 59
End: 2024-08-08
Payer: COMMERCIAL

## 2024-08-08 VITALS
BODY MASS INDEX: 30.55 KG/M2 | DIASTOLIC BLOOD PRESSURE: 80 MMHG | WEIGHT: 201.6 LBS | HEART RATE: 79 BPM | RESPIRATION RATE: 16 BRPM | OXYGEN SATURATION: 98 % | SYSTOLIC BLOOD PRESSURE: 110 MMHG | HEIGHT: 68 IN

## 2024-08-08 DIAGNOSIS — M79.609 PARESTHESIA AND PAIN OF LEFT EXTREMITY: ICD-10-CM

## 2024-08-08 DIAGNOSIS — R20.2 PARESTHESIA AND PAIN OF LEFT EXTREMITY: ICD-10-CM

## 2024-08-08 DIAGNOSIS — R53.83 OTHER FATIGUE: Primary | ICD-10-CM

## 2024-08-08 NOTE — ASSESSMENT & PLAN NOTE
Numbness and tingling, primarily tingling, to left hand.  Primarily over dorsum of hand and extensor aspect of forearm.  However, symptoms are provoked with Phalen maneuver.  Consider carpal tunnel syndrome, okay to trial bracing.  Will evaluate for metabolic contributors to paresthesias.

## 2024-08-08 NOTE — ASSESSMENT & PLAN NOTE
2 weeks of worsening fatigue with malaise.  Patient reports that due to thyroidism, she is used to being fatigued, however, she has been sleeping more poorly and generally just not feeling well over the past 2 weeks.  Will evaluate metabolic contributors to fatigue.  Consider postviral, however, patient does not remember any acute viral syndrome.  She did travel to the [place] beginning of June.  She did have a spider bite at the base of her spine that healed.  No other symptoms.

## 2024-08-08 NOTE — PROGRESS NOTES
"Answers submitted by the patient for this visit:  Other (Submitted on 8/8/2024)  Please describe your symptoms.: Tired. Tingling in left arm. Headache  Have you had these symptoms before?: Yes  How long have you been having these symptoms?: 1-2 weeks  Please list any medications you are currently taking for this condition.: Tylenol  Primary Reason for Visit (Submitted on 8/8/2024)  What is the primary reason for your visit?: Other  Chief Complaint  Numbness (Hand and arm on the left )    Subjective        Theresa Palacio presents to [facility]  History of Present Illness  59-year-old female presents to clinic after having had about 2 weeks of increased fatigue, tingling to the left forearm and hand, and headaches.  Headaches are bandlike.  No visual changes, no chest pain or shortness of breath, no nausea or vomiting.  No increase in night sweats from baseline during menopause.  No unintentional weight loss.      Objective   Vital Signs:  /80   Pulse 79   Resp 16   Ht 172.7 cm (67.99\")   Wt 91.4 kg (201 lb 9.6 oz)   SpO2 98%   BMI 30.66 kg/m²   Estimated body mass index is 30.66 kg/m² as calculated from the following:    Height as of this encounter: 172.7 cm (67.99\").    Weight as of this encounter: 91.4 kg (201 lb 9.6 oz).               Physical Exam  Constitutional:       Appearance: Normal appearance.   HENT:      Head: Normocephalic and atraumatic.      Nose: Nose normal.      Mouth/Throat:      Mouth: Mucous membranes are moist.   Eyes:      General: Lids are normal.      Conjunctiva/sclera: Conjunctivae normal.   Pulmonary:      Effort: Pulmonary effort is normal.   Musculoskeletal:      Cervical back: Normal range of motion.   Skin:     General: Skin is warm and dry.   Neurological:      General: No focal deficit present.      Mental Status: She is alert and oriented to person, place, and time.      Motor: Motor function is intact.      Coordination: Coordination " is intact.      Gait: Gait is intact.      Comments: No sensory deficits.  Strength with opposition of great thumb to fifth digit of left hand potentially mildly impaired, no overt motor deficits.   Psychiatric:         Mood and Affect: Mood normal.         Behavior: Behavior normal.         Thought Content: Thought content normal.        Result Review :                     Assessment and Plan     Diagnoses and all orders for this visit:    1. Other fatigue (Primary)  Assessment & Plan:  2 weeks of worsening fatigue with malaise.  Patient reports that due to thyroidism, she is used to being fatigued, however, she has been sleeping more poorly and generally just not feeling well over the past 2 weeks.  Will evaluate metabolic contributors to fatigue.  Consider postviral, however, patient does not remember any acute viral syndrome.  She did travel to the [place] beginning of June.  She did have a spider bite at the base of her spine that healed.  No other symptoms.    Orders:  -     CBC & Differential  -     Vitamin B12  -     Folate  -     Comprehensive metabolic panel  -     TSH Rfx On Abnormal To Free T4  -     Vitamin D,25-Hydroxy  -     Ferritin  -     Iron Profile    2. Paresthesia and pain of left extremity  Assessment & Plan:  Numbness and tingling, primarily tingling, to left hand.  Primarily over dorsum of hand and extensor aspect of forearm.  However, symptoms are provoked with Phalen maneuver.  Consider carpal tunnel syndrome, okay to trial bracing.  Will evaluate for metabolic contributors to paresthesias.             I spent 31 minutes caring for Theresa on this date of service. This time includes time spent by me in the following activities:preparing for the visit, reviewing tests, obtaining and/or reviewing a separately obtained history, performing a medically appropriate examination and/or evaluation , counseling and educating the patient/family/caregiver, ordering medications, tests, or  procedures, and documenting information in the medical record  Follow Up     No follow-ups on file.  Patient was given instructions and counseling regarding her condition or for health maintenance advice. Please see specific information pulled into the AVS if appropriate.

## 2024-08-09 DIAGNOSIS — R74.8 ELEVATED LIVER ENZYMES: Primary | ICD-10-CM

## 2024-08-09 LAB
25(OH)D3+25(OH)D2 SERPL-MCNC: 29 NG/ML (ref 30–100)
ALBUMIN SERPL-MCNC: 4.5 G/DL (ref 3.5–5.2)
ALBUMIN/GLOB SERPL: 1.7 G/DL
ALP SERPL-CCNC: 78 U/L (ref 39–117)
ALT SERPL-CCNC: 40 U/L (ref 1–33)
AST SERPL-CCNC: 26 U/L (ref 1–32)
BASOPHILS # BLD AUTO: 0.02 10*3/MM3 (ref 0–0.2)
BASOPHILS NFR BLD AUTO: 0.4 % (ref 0–1.5)
BILIRUB SERPL-MCNC: <0.2 MG/DL (ref 0–1.2)
BUN SERPL-MCNC: 17 MG/DL (ref 8–23)
BUN/CREAT SERPL: 27 (ref 7–25)
CALCIUM SERPL-MCNC: 9.9 MG/DL (ref 8.6–10.5)
CHLORIDE SERPL-SCNC: 100 MMOL/L (ref 98–107)
CO2 SERPL-SCNC: 26.5 MMOL/L (ref 22–29)
CREAT SERPL-MCNC: 0.63 MG/DL (ref 0.57–1)
EGFRCR SERPLBLD CKD-EPI 2021: 101.1 ML/MIN/1.73
EOSINOPHIL # BLD AUTO: 0.08 10*3/MM3 (ref 0–0.4)
EOSINOPHIL NFR BLD AUTO: 1.6 % (ref 0.3–6.2)
ERYTHROCYTE [DISTWIDTH] IN BLOOD BY AUTOMATED COUNT: 13.2 % (ref 12.3–15.4)
FERRITIN SERPL-MCNC: 191 NG/ML (ref 13–150)
FOLATE SERPL-MCNC: 8.56 NG/ML (ref 4.78–24.2)
GLOBULIN SER CALC-MCNC: 2.7 GM/DL
GLUCOSE SERPL-MCNC: 129 MG/DL (ref 65–99)
HCT VFR BLD AUTO: 43.7 % (ref 34–46.6)
HGB BLD-MCNC: 14.7 G/DL (ref 12–15.9)
IMM GRANULOCYTES # BLD AUTO: 0.01 10*3/MM3 (ref 0–0.05)
IMM GRANULOCYTES NFR BLD AUTO: 0.2 % (ref 0–0.5)
IRON SATN MFR SERPL: 26 % (ref 20–50)
IRON SERPL-MCNC: 87 MCG/DL (ref 37–145)
LYMPHOCYTES # BLD AUTO: 1.92 10*3/MM3 (ref 0.7–3.1)
LYMPHOCYTES NFR BLD AUTO: 38.6 % (ref 19.6–45.3)
MCH RBC QN AUTO: 31.5 PG (ref 26.6–33)
MCHC RBC AUTO-ENTMCNC: 33.6 G/DL (ref 31.5–35.7)
MCV RBC AUTO: 93.6 FL (ref 79–97)
MONOCYTES # BLD AUTO: 0.3 10*3/MM3 (ref 0.1–0.9)
MONOCYTES NFR BLD AUTO: 6 % (ref 5–12)
NEUTROPHILS # BLD AUTO: 2.65 10*3/MM3 (ref 1.7–7)
NEUTROPHILS NFR BLD AUTO: 53.2 % (ref 42.7–76)
NRBC BLD AUTO-RTO: 0 /100 WBC (ref 0–0.2)
PLATELET # BLD AUTO: 226 10*3/MM3 (ref 140–450)
POTASSIUM SERPL-SCNC: 4.4 MMOL/L (ref 3.5–5.2)
PROT SERPL-MCNC: 7.2 G/DL (ref 6–8.5)
RBC # BLD AUTO: 4.67 10*6/MM3 (ref 3.77–5.28)
SODIUM SERPL-SCNC: 138 MMOL/L (ref 136–145)
TIBC SERPL-MCNC: 340 MCG/DL
TSH SERPL DL<=0.005 MIU/L-ACNC: 0.83 UIU/ML (ref 0.27–4.2)
UIBC SERPL-MCNC: 253 MCG/DL (ref 112–346)
VIT B12 SERPL-MCNC: 703 PG/ML (ref 211–946)
WBC # BLD AUTO: 4.98 10*3/MM3 (ref 3.4–10.8)

## 2024-09-04 ENCOUNTER — OFFICE VISIT (OUTPATIENT)
Dept: FAMILY MEDICINE CLINIC | Facility: CLINIC | Age: 59
End: 2024-09-04
Payer: COMMERCIAL

## 2024-09-04 VITALS
WEIGHT: 201.7 LBS | BODY MASS INDEX: 30.57 KG/M2 | RESPIRATION RATE: 16 BRPM | SYSTOLIC BLOOD PRESSURE: 130 MMHG | HEART RATE: 71 BPM | HEIGHT: 68 IN | OXYGEN SATURATION: 99 % | TEMPERATURE: 98 F | DIASTOLIC BLOOD PRESSURE: 72 MMHG

## 2024-09-04 DIAGNOSIS — K76.0 NONALCOHOLIC FATTY LIVER DISEASE: Primary | ICD-10-CM

## 2024-09-04 PROCEDURE — 99213 OFFICE O/P EST LOW 20 MIN: CPT | Performed by: FAMILY MEDICINE

## 2024-09-04 RX ORDER — AMOXICILLIN 500 MG/1
500 CAPSULE ORAL
COMMUNITY
Start: 2024-08-30

## 2024-09-11 NOTE — ASSESSMENT & PLAN NOTE
At this time, patient wishes to defer GLP-1.  She has a upcoming appointment with her primary care physician, and she will further consider treatment and discuss risks and benefits of therapy with her primary care.

## 2024-09-11 NOTE — PROGRESS NOTES
"Chief Complaint  follow up on labs    Subjective        Theresa Palacio presents to [facility]  History of Present Illness  59-year-old female presents to clinic to follow-up on nonalcoholic fatty liver disease.      Objective   Vital Signs:  /72   Pulse 71   Temp 98 °F (36.7 °C)   Resp 16   Ht 172.7 cm (67.99\")   Wt 91.5 kg (201 lb 11.2 oz)   SpO2 99%   BMI 30.68 kg/m²   Estimated body mass index is 30.68 kg/m² as calculated from the following:    Height as of this encounter: 172.7 cm (67.99\").    Weight as of this encounter: 91.5 kg (201 lb 11.2 oz).            Physical Exam  Constitutional:       Appearance: Normal appearance.   HENT:      Head: Normocephalic and atraumatic.      Nose: Nose normal.      Mouth/Throat:      Mouth: Mucous membranes are moist.   Eyes:      General: Lids are normal.      Conjunctiva/sclera: Conjunctivae normal.   Pulmonary:      Effort: Pulmonary effort is normal.   Skin:     General: Skin is warm and dry.   Neurological:      General: No focal deficit present.      Mental Status: She is alert and oriented to person, place, and time.      Gait: Gait is intact.   Psychiatric:         Mood and Affect: Mood normal.         Behavior: Behavior normal.         Thought Content: Thought content normal.        Result Review :                Assessment and Plan   Diagnoses and all orders for this visit:    1. Nonalcoholic fatty liver disease (Primary)  Assessment & Plan:  At this time, patient wishes to defer GLP-1.  She has a upcoming appointment with her primary care physician, and she will further consider treatment and discuss risks and benefits of therapy with her primary care.               Follow Up   No follow-ups on file.  Patient was given instructions and counseling regarding her condition or for health maintenance advice. Please see specific information pulled into the AVS if appropriate.             "

## 2024-09-18 ENCOUNTER — OFFICE VISIT (OUTPATIENT)
Dept: FAMILY MEDICINE CLINIC | Facility: CLINIC | Age: 59
End: 2024-09-18
Payer: COMMERCIAL

## 2024-09-18 VITALS
RESPIRATION RATE: 16 BRPM | HEART RATE: 69 BPM | TEMPERATURE: 98.8 F | OXYGEN SATURATION: 97 % | HEIGHT: 67 IN | DIASTOLIC BLOOD PRESSURE: 82 MMHG | WEIGHT: 201.5 LBS | BODY MASS INDEX: 31.63 KG/M2 | SYSTOLIC BLOOD PRESSURE: 129 MMHG

## 2024-09-18 DIAGNOSIS — R82.90 BAD ODOR OF URINE: ICD-10-CM

## 2024-09-18 DIAGNOSIS — K76.0 NONALCOHOLIC FATTY LIVER DISEASE: ICD-10-CM

## 2024-09-18 DIAGNOSIS — R73.03 PREDIABETES: ICD-10-CM

## 2024-09-18 DIAGNOSIS — E66.9 OBESITY (BMI 30-39.9): ICD-10-CM

## 2024-09-18 DIAGNOSIS — Z00.00 WELL ADULT EXAM: Primary | ICD-10-CM

## 2024-09-18 DIAGNOSIS — E03.9 HYPOTHYROIDISM, UNSPECIFIED TYPE: ICD-10-CM

## 2024-09-18 DIAGNOSIS — R10.32 LLQ PAIN: ICD-10-CM

## 2024-09-18 LAB
BILIRUB BLD-MCNC: NEGATIVE MG/DL
CLARITY, POC: CLEAR
COLOR UR: YELLOW
EXPIRATION DATE: ABNORMAL
GLUCOSE UR STRIP-MCNC: NEGATIVE MG/DL
KETONES UR QL: NEGATIVE
LEUKOCYTE EST, POC: ABNORMAL
Lab: ABNORMAL
NITRITE UR-MCNC: NEGATIVE MG/ML
PH UR: 6 [PH] (ref 5–8)
PROT UR STRIP-MCNC: NEGATIVE MG/DL
RBC # UR STRIP: NEGATIVE /UL
SP GR UR: 1.02 (ref 1–1.03)
UROBILINOGEN UR QL: ABNORMAL

## 2024-09-18 PROCEDURE — 81003 URINALYSIS AUTO W/O SCOPE: CPT | Performed by: FAMILY MEDICINE

## 2024-09-18 PROCEDURE — 99214 OFFICE O/P EST MOD 30 MIN: CPT | Performed by: FAMILY MEDICINE

## 2024-09-18 PROCEDURE — 90471 IMMUNIZATION ADMIN: CPT | Performed by: FAMILY MEDICINE

## 2024-09-18 PROCEDURE — 90656 IIV3 VACC NO PRSV 0.5 ML IM: CPT | Performed by: FAMILY MEDICINE

## 2024-09-18 PROCEDURE — 99396 PREV VISIT EST AGE 40-64: CPT | Performed by: FAMILY MEDICINE

## 2024-11-01 DIAGNOSIS — E55.9 VITAMIN D DEFICIENCY: ICD-10-CM

## 2024-11-01 RX ORDER — ACETAMINOPHEN 160 MG
TABLET,DISINTEGRATING ORAL
Qty: 90 CAPSULE | Refills: 3 | Status: SHIPPED | OUTPATIENT
Start: 2024-11-01

## 2024-11-01 NOTE — TELEPHONE ENCOUNTER
Rx Refill Note  Requested Prescriptions     Pending Prescriptions Disp Refills    Cholecalciferol (Vitamin D3) 50 MCG (2000 UT) capsule [Pharmacy Med Name: VITAMIN D3 2,000 UNIT SOFTGEL] 90 capsule 3     Sig: TAKE 1 CAPSULE BY MOUTH DAILY. INDICATIONS: VITAMIN-D DEFICIENCY      Last office visit with prescribing clinician: 5/16/2024   Last telemedicine visit with prescribing clinician: Visit date not found   Next office visit with prescribing clinician: 11/19/2024                         Would you like a call back once the refill request has been completed: [] Yes [] No    If the office needs to give you a call back, can they leave a voicemail: [] Yes [] No    Nancy Damon MA  11/01/24, 09:37 EDT

## 2024-11-19 ENCOUNTER — OFFICE VISIT (OUTPATIENT)
Dept: ENDOCRINOLOGY | Age: 59
End: 2024-11-19
Payer: COMMERCIAL

## 2024-11-19 VITALS
BODY MASS INDEX: 31.92 KG/M2 | TEMPERATURE: 98.4 F | HEART RATE: 70 BPM | WEIGHT: 203.4 LBS | SYSTOLIC BLOOD PRESSURE: 126 MMHG | HEIGHT: 67 IN | DIASTOLIC BLOOD PRESSURE: 90 MMHG | OXYGEN SATURATION: 98 %

## 2024-11-19 DIAGNOSIS — E55.9 VITAMIN D DEFICIENCY: ICD-10-CM

## 2024-11-19 DIAGNOSIS — G47.33 OSA (OBSTRUCTIVE SLEEP APNEA): ICD-10-CM

## 2024-11-19 DIAGNOSIS — E03.9 PRIMARY HYPOTHYROIDISM: Primary | ICD-10-CM

## 2024-11-19 DIAGNOSIS — E04.1 SOLITARY THYROID NODULE: ICD-10-CM

## 2024-11-19 DIAGNOSIS — R73.03 PREDIABETES: ICD-10-CM

## 2024-11-19 DIAGNOSIS — E78.5 HYPERLIPIDEMIA, UNSPECIFIED HYPERLIPIDEMIA TYPE: ICD-10-CM

## 2024-11-19 PROCEDURE — 99214 OFFICE O/P EST MOD 30 MIN: CPT | Performed by: INTERNAL MEDICINE

## 2024-11-19 RX ORDER — CHLORHEXIDINE GLUCONATE ORAL RINSE 1.2 MG/ML
SOLUTION DENTAL
COMMUNITY
Start: 2024-11-13

## 2024-11-19 RX ORDER — AMOXICILLIN 500 MG/1
CAPSULE ORAL
COMMUNITY
Start: 2024-11-13

## 2024-11-19 NOTE — PROGRESS NOTES
Subjective   Theresa Palacio is a 59 y.o. female.     History of Present Illness        She has hypothyroidism and is on levothyroxine 125 mcg/day.  Thyroid ultrasound done in 2018 showed a stable 1 cm left thyroid nodule.  She has no history of surgery or radiation therapy to the head or neck area.  She denies voice changes.  She has lost 5 pounds since May 2024.     She has hyperlipidemia and went off Crestor 40 mg several months ago because of achiness of lower back and shoulders.  Achiness did not resolve after she stopped Crestor.  Abdominal ultrasound done in  showed fatty liver.  She denies history of hepatitis.  She has used Lipitor in the past.  She has not used Zetia before.  Her last meal was last night.     She has vitamin D deficiency and is on vit D3 2000 units/day.  She denies chronic diarrhea.       She has prediabetes since  and is on diet alone.  She had diarrhea with metformin  mg once a day in the past.  Her last eye examination was in 2024.  Her last meal was last night.    Hemoglobin A1c done in May 2024 6.1%.     She is  3 para 3.  Her last delivery was in .  She has no history of gestational diabetes mellitus or hypertension.  She had a total hysterectomy with bilateral salpingo-oophorectomy for endometriosis in .  She is not on estrogen replacement therapy.  She has osteopenia on bone density done at her gynecologist Dr. Maharaj.  She has no parental history of hip fracture.  She denies alcohol or tobacco use.     She has Factor 5 Leiden mutation and was seen by Dr. Chauhan in the past.  Activated protein C resistance ratio is low at 1.8 in May 2022.  She has no history of deep vein thrombosis or arterial thrombosis.  She is on Eliquis when she travels.       She has sleep apnea and is not using CPAP regularly.  She wakes up rested.  She has not seen Dr. BARTOLO Murdock recently.     She is retired .    She had COVID-vaccine this fall  "without major side effects.        The following portions of the patient's history were reviewed and updated as appropriate: allergies, current medications, past family history, past medical history, past social history, past surgical history, and problem list.    Review of Systems   Eyes:  Negative for visual disturbance.   Respiratory:  Negative for shortness of breath.    Cardiovascular:  Negative for chest pain and palpitations.   Gastrointestinal: Negative.    Genitourinary: Negative.    Musculoskeletal:  Positive for arthralgias (shoulder) and back pain. Negative for myalgias.   Neurological:  Negative for numbness.     Vitals:    11/19/24 1000   BP: 126/90   Pulse: 70   Temp: 98.4 °F (36.9 °C)   TempSrc: Oral   SpO2: 98%   Weight: 92.3 kg (203 lb 6.4 oz)   Height: 170.2 cm (67.01\")      Objective   Physical Exam  Office Visit on 09/18/2024   Component Date Value Ref Range Status    Color 09/18/2024 Yellow  Yellow, Straw, Dark Yellow, Hue Final    Clarity, UA 09/18/2024 Clear  Clear Final    Specific Gravity  09/18/2024 1.020  1.005 - 1.030 Final    pH, Urine 09/18/2024 6.0  5.0 - 8.0 Final    Leukocytes 09/18/2024 Small (1+) (A)  Negative Final    Nitrite, UA 09/18/2024 Negative  Negative Final    Protein, POC 09/18/2024 Negative  Negative mg/dL Final    Glucose, UA 09/18/2024 Negative  Negative mg/dL Final    Ketones, UA 09/18/2024 Negative  Negative Final    Urobilinogen, UA 09/18/2024 0.2 E.U./dL  Normal, 0.2 E.U./dL Final    Bilirubin 09/18/2024 Negative  Negative Final    Blood, UA 09/18/2024 Negative  Negative Final    Lot Number 09/18/2024 [id number]   Final    Expiration Date 09/18/2024 03-31-25   Final     Assessment & Plan   Diagnoses and all orders for this visit:    1. Primary hypothyroidism (Primary)  -     TSH Rfx On Abnormal To Free T4    2. Solitary thyroid nodule  -     TSH Rfx On Abnormal To Free T4    3. Hyperlipidemia, unspecified hyperlipidemia type  -     Comprehensive Metabolic Panel  -  "    Lipid Panel    4. Vitamin D deficiency  -     Vitamin D,25-Hydroxy    5. Prediabetes  -     Microalbumin / Creatinine Urine Ratio - Urine, Clean Catch    6. KAYLI (obstructive sleep apnea)      Continue levothyroxine 125 mcg/day.    Check lipid panel.  Consider Zetia.    Continue vitamin D3 2000 units/day.  Suggest follow-up bone density with Dr. Maharaj    Use CPAP regularly.    Copy of my note sent to Dr. Garza.    RTC 6 mos.

## 2024-11-22 LAB
25(OH)D3+25(OH)D2 SERPL-MCNC: 27.2 NG/ML (ref 30–100)
ALBUMIN SERPL-MCNC: 4.7 G/DL (ref 3.5–5.2)
ALBUMIN/CREAT UR: 7 MG/G CREAT (ref 0–29)
ALBUMIN/GLOB SERPL: 1.6 G/DL
ALP SERPL-CCNC: 79 U/L (ref 39–117)
ALT SERPL-CCNC: 33 U/L (ref 1–33)
AST SERPL-CCNC: 24 U/L (ref 1–32)
BILIRUB SERPL-MCNC: 0.4 MG/DL (ref 0–1.2)
BUN SERPL-MCNC: 11 MG/DL (ref 8–23)
BUN/CREAT SERPL: 15.7 (ref 7–25)
CALCIUM SERPL-MCNC: 10 MG/DL (ref 8.6–10.5)
CHLORIDE SERPL-SCNC: 103 MMOL/L (ref 98–107)
CHOLEST SERPL-MCNC: 281 MG/DL (ref 0–200)
CO2 SERPL-SCNC: 26 MMOL/L (ref 22–29)
CREAT SERPL-MCNC: 0.7 MG/DL (ref 0.57–1)
CREAT UR-MCNC: 127.7 MG/DL
EGFRCR SERPLBLD CKD-EPI 2021: 98.5 ML/MIN/1.73
GLOBULIN SER CALC-MCNC: 3 GM/DL
GLUCOSE SERPL-MCNC: 103 MG/DL (ref 65–99)
HDLC SERPL-MCNC: 48 MG/DL (ref 40–60)
IMP & REVIEW OF LAB RESULTS: NORMAL
LDLC SERPL CALC-MCNC: 196 MG/DL (ref 0–100)
MICROALBUMIN UR-MCNC: 8.9 UG/ML
POTASSIUM SERPL-SCNC: 4.8 MMOL/L (ref 3.5–5.2)
PROT SERPL-MCNC: 7.7 G/DL (ref 6–8.5)
SODIUM SERPL-SCNC: 142 MMOL/L (ref 136–145)
TRIGL SERPL-MCNC: 194 MG/DL (ref 0–150)
TSH SERPL DL<=0.005 MIU/L-ACNC: 1.58 UIU/ML (ref 0.27–4.2)
VLDLC SERPL CALC-MCNC: 37 MG/DL (ref 5–40)

## 2024-11-27 DIAGNOSIS — E78.5 HYPERLIPIDEMIA, UNSPECIFIED HYPERLIPIDEMIA TYPE: Primary | ICD-10-CM

## 2024-11-27 RX ORDER — ATORVASTATIN CALCIUM 10 MG/1
10 TABLET, FILM COATED ORAL DAILY
Qty: 90 TABLET | Refills: 1 | Status: SHIPPED | OUTPATIENT
Start: 2024-11-27 | End: 2025-11-27

## 2024-12-03 DIAGNOSIS — E03.9 ADULT HYPOTHYROIDISM: ICD-10-CM

## 2024-12-03 RX ORDER — LEVOTHYROXINE SODIUM 125 UG/1
125 TABLET ORAL DAILY
Qty: 90 TABLET | Refills: 2 | Status: SHIPPED | OUTPATIENT
Start: 2024-12-03

## 2024-12-03 NOTE — TELEPHONE ENCOUNTER
Rx Refill Note  Requested Prescriptions     Pending Prescriptions Disp Refills    levothyroxine (SYNTHROID, LEVOTHROID) 125 MCG tablet 90 tablet 1     Sig: Take 1 tablet by mouth Daily.      Last office visit with prescribing clinician: 11/19/2024   Last telemedicine visit with prescribing clinician: Visit date not found   Next office visit with prescribing clinician: 5/19/2025                         Would you like a call back once the refill request has been completed: [] Yes [] No    If the office needs to give you a call back, can they leave a voicemail: [] Yes [] No    Sarah Hanna MA  12/03/24, 11:48 EST

## 2024-12-17 ENCOUNTER — APPOINTMENT (OUTPATIENT)
Dept: WOMENS IMAGING | Facility: HOSPITAL | Age: 59
End: 2024-12-17
Payer: COMMERCIAL

## 2024-12-17 PROCEDURE — 77066 DX MAMMO INCL CAD BI: CPT | Performed by: RADIOLOGY

## 2024-12-17 PROCEDURE — 76642 ULTRASOUND BREAST LIMITED: CPT | Performed by: RADIOLOGY

## 2024-12-17 PROCEDURE — 77062 BREAST TOMOSYNTHESIS BI: CPT | Performed by: RADIOLOGY

## 2024-12-17 PROCEDURE — G0279 TOMOSYNTHESIS, MAMMO: HCPCS | Performed by: RADIOLOGY

## 2025-01-30 ENCOUNTER — OFFICE VISIT (OUTPATIENT)
Dept: FAMILY MEDICINE CLINIC | Facility: CLINIC | Age: 60
End: 2025-01-30
Payer: COMMERCIAL

## 2025-01-30 VITALS
RESPIRATION RATE: 18 BRPM | HEIGHT: 67 IN | SYSTOLIC BLOOD PRESSURE: 138 MMHG | OXYGEN SATURATION: 98 % | BODY MASS INDEX: 32.49 KG/M2 | HEART RATE: 72 BPM | WEIGHT: 207 LBS | DIASTOLIC BLOOD PRESSURE: 86 MMHG

## 2025-01-30 DIAGNOSIS — R73.03 PREDIABETES: ICD-10-CM

## 2025-01-30 DIAGNOSIS — E66.9 OBESITY (BMI 30-39.9): Primary | ICD-10-CM

## 2025-01-30 DIAGNOSIS — G47.33 OSA (OBSTRUCTIVE SLEEP APNEA): ICD-10-CM

## 2025-01-30 DIAGNOSIS — K76.0 NONALCOHOLIC FATTY LIVER DISEASE: ICD-10-CM

## 2025-01-30 PROCEDURE — 99213 OFFICE O/P EST LOW 20 MIN: CPT | Performed by: FAMILY MEDICINE

## 2025-01-30 NOTE — PROGRESS NOTES
"Chief Complaint  Weight loss    Subjective    History of Present Illness {CC  Problem List  Visit  Diagnosis   Encounters  Notes  Medications  Labs  Result Review Imaging  Media :23}     Theresa Palacio presents to [facility] for Weight loss.  Weight Management  Weight:  Unchanged  Weight loss treatment:  Low calorie, low carb diet, portion control, starting exercise program and decreasing alcohol consumption  Physical activity tolerance:  Stable  Energy level:  Unchanged       She presents to discuss obesity and medication for weight loss. She has look and is interested in GLP-1 for weight loss as it could also potentially help her prediabetes, hepatic steatosis and KAYLI in addition. She is aware that medication may need to be continued long term to maintain weight loss. She is scheduled for surgery on 3/17 and wasn't sure if could start medication now or should wait until after since would have to hold medication prior to surgery.         Objective     Vital Signs:   /86   Pulse 72   Resp 18   Ht 170.2 cm (67.01\")   Wt 93.9 kg (207 lb)   SpO2 98%   BMI 32.41 kg/m²   Body mass index is 32.41 kg/m².     Physical Exam  Constitutional:       General: She is not in acute distress.  Pulmonary:      Effort: No respiratory distress.      Breath sounds: Normal breath sounds.   Neurological:      Mental Status: She is alert.   Psychiatric:         Behavior: Behavior normal.          Result Review  Data Reviewed:{ Labs  Result Review  Imaging  Med Tab  Media :23}                Assessment and Plan {CC Problem List  Visit Diagnosis  ROS  Review (Popup)  Health Maintenance  Quality  BestPractice  Medications  SmartSets  SnapShot Encounters  Media :23}   Diagnoses and all orders for this visit:    1. Obesity (BMI 30-39.9) (Primary)    2. Prediabetes    3. Nonalcoholic fatty liver disease    4. KAYLI (obstructive sleep apnea)    Other orders  -     " Tirzepatide-Weight Management (ZEPBOUND) 2.5 MG/0.5ML solution auto-injector; Inject 0.5 mL under the skin into the appropriate area as directed 1 (One) Time Per Week.  Dispense: 2 mL; Refill: 0        Patient Instructions   Continue your current medications, healthy diet and regular exercise.  Plan to start zepbound after surgery. 2.5mg weekly for 4 weeks and then 5mg weekly with recheck 3-4 months after starting medication.   Encourage at least 100grams of protein daily on medication     Patient was given instructions and counseling regarding her condition or for health maintenance advice on the AVS.       No follow-ups on file.    Ann Garza MD

## 2025-01-30 NOTE — PATIENT INSTRUCTIONS
Continue your current medications, healthy diet and regular exercise.  Plan to start zepbound after surgery. 2.5mg weekly for 4 weeks and then 5mg weekly with recheck 3-4 months after starting medication.   Encourage at least 100grams of protein daily on medication